# Patient Record
Sex: FEMALE
[De-identification: names, ages, dates, MRNs, and addresses within clinical notes are randomized per-mention and may not be internally consistent; named-entity substitution may affect disease eponyms.]

---

## 2017-08-08 NOTE — CT
PROCEDURE:  CT HEAD WITHOUT CONTRAST.



HISTORY:

head injury/pain



COMPARISON:

06/21/2016 



TECHNIQUE:

Axial computed tomography images were obtained through the head/brain 

without intravenous contrast.  



Radiation dose:



Total exam DLP = 1112 mGy-cm.



This CT exam was performed using one or more of the following dose 

reduction techniques: Automated exposure control, adjustment of the 

mA and/or kV according to patient size, and/or use of iterative 

reconstruction technique.



FINDINGS:



HEMORRHAGE:

No intracranial hemorrhage. 



BRAIN:

No mass effect or edema.  Moderate atrophy.  Mild chronic 

microvascular changes



VENTRICLES:

Unremarkable. No hydrocephalus. 



CALVARIUM:

Unremarkable.



PARANASAL SINUSES:

Unremarkable as visualized. No significant inflammatory changes.



MASTOID AIR CELLS:

Unremarkable as visualized. No inflammatory changes.



OTHER FINDINGS:

None.



IMPRESSION:

No acute findings

## 2017-08-08 NOTE — ED PDOC
Arrival/HPI





- General


Historian: Patient, Spouse





- History of Present Illness


Time/Duration: Other (6 days)


Quality: Aching


Context: Home





- General


Chief Complaint: Trauma


Time Seen by Provider: 08/08/17 09:46





- History of Present Illness


Narrative History of Present Illness (Text): 





08/08/17 09:47


This 80 yo female with pmh osteopenia, htn,mild dementia (as per ) 

presents, to this ED c/o back pain x 6 days.   stated patient had hit 

her left side face during the fall.  Patient stated she tripped and fell 

backwards.  Denies HA, diplopia, dysarthria, dysphagia, sob, cp, /GI 

incontinence, saddle anesthesia, weakness, paresthesias, hip pain, knee pain, 

neck pain, ankle pain, urinary retention, or abnormal gait. (Yovana Manriquez)





Past Medical History





- Provider Review


Nursing Documentation Reviewed: Yes





- Past History


Past History: Non-Contributing





- Infectious Disease


Hx of Infectious Diseases: None





- Tetanus Immunization


Tetanus Immunization: Unknown





- Cardiac


Hx Cardiac Disorders: Yes


Hx Hypertension: Yes





- Pulmonary


Hx Respiratory Disorders: No





- Neurological


Hx Neurological Disorder: No





- HEENT


Hx HEENT Disorder: No





- Renal


Hx Renal Disorder: No





- Endocrine/Metabolic


Hx Endocrine Disorders: Yes


Hx Hyperthyroidism: Yes





- Hematological/Oncological


Hx Blood Disorders: No





- Integumentary


Hx Dermatological Disorder: No





- Musculoskeletal/Rheumatological


Hx Musculoskeletal Disorders: Yes


Hx Falls: Yes (last month and last week)





- Gastrointestinal


Hx Gastrointestinal Disorders: No





- Genitourinary/Gynecological


Hx Genitourinary Disorders: No





- Psychiatric


Hx Psychophysiologic Disorder: No


Hx Depression: No


Hx Emotional Abuse: No


Hx Physical Abuse: No


Hx Substance Use: No





- Surgical History


Hx Appendectomy: Yes





- Anesthesia


Hx Anesthesia: Yes


Hx Anesthesia Reactions: No


Hx Malignant Hyperthermia: No





- Suicidal Assessment


Feels Threatened In Home Enviroment: No





Family/Social History





- Physician Review


Nursing Documentation Reviewed: Yes


Family/Social History: No Known Family HX


Smoking Status: Never Smoked


Hx Alcohol Use: No


Hx Substance Use: No


Hx Substance Use Treatment: No





Allergies/Home Meds


Allergies/Adverse Reactions: 


Allergies





No Known Allergies Allergy (Verified 08/08/17 09:31)


 








Home Medications: 


 Home Meds











 Medication  Instructions  Recorded  Confirmed


 


Amlodipine Besylate [Norvasc] 10 mg PO DAILY 02/14/13 08/08/17


 


Aspirin [Aspirin Children's] 81 mg PO DAILY 02/14/13 08/08/17


 


Calcium Carbonate/Vitamin D 1 tab PO DAILY 02/14/13 08/08/17





[Calcium + D 600 mg-200 Iu]   


 


Levothyroxine Sodium [Synthroid] 0.1 mg PO DAILY 02/14/13 08/08/17


 


Multivitamin and Minerals1 1 tab PO DAILY 02/14/13 08/08/17





[Centrum]   


 


Simvastatin 20 mg PO DAILY 02/14/13 08/08/17


 


Lisinopril [Zestril] 2.5 mg PO DAILY 08/08/17 08/08/17














Review of Systems





- Review of Systems


Constitutional: Normal.  absent: Fatigue, Weight Change, Fevers, Night Sweats


Eyes: Normal


ENT: Normal


Respiratory: Normal.  absent: SOB, Cough


Cardiovascular: Normal.  absent: Chest Pain, Palpitations


Gastrointestinal: Normal.  absent: Abdominal Pain, Nausea, Vomiting


Genitourinary Female: Normal.  absent: Dysuria, Frequency, Hematuria, Vaginal 

Bleeding, Vaginal Discharge


Musculoskeletal: Back Pain.  absent: Arthralgias, Neck Pain, Joint Swelling, 

Myalgias


Skin: Normal.  absent: Rash, Laceration


Neurological: Normal.  absent: Headache, Dizziness, Focal Weakness, Gait Changes

, Speech Changes, Facial Droop, Disequilibrium, Seizure


Endocrine: Normal


Hemo/Lymphatic: Normal


Psychiatric: Normal





Physical Exam


Temperature: Afebrile


Blood Pressure: Normal


Pulse: Regular


Respiratory Rate: Normal


Appearance: Positive for: Well-Appearing, Non-Toxic, Comfortable


Pain Distress: None


Mental Status: Positive for: Alert and Oriented X 3





- Systems Exam


Head: Present: Atraumatic, Normocephalic, Other ((+) small left cheek bruise.  

No raccoon sign.  no harrison sign)


Pupils: Present: PERRL, Other (no hyphema)


Extroacular Muscles: Present: EOMI.  No: Entrapment


Conjunctiva: Present: Normal


Ears: Present: Normal, NORMAL TM, Normal Canal, Other (No hemotympanum).  No: 

Erythema, TM Bulging, TM Perf


Mouth: Present: Moist Mucous Membranes


Pharnyx: Present: Normal.  No: ERYTHEMA, EXUDATE


Nose (External): Present: Atraumatic


Nose (Internal): Present: Normal Inspection


Neck: Present: Normal Range of Motion.  No: Meningeal Signs


Respiratory/Chest: Present: Clear to Auscultation, Good Air Exchange.  No: 

Respiratory Distress, Accessory Muscle Use


Cardiovascular: Present: Regular Rate and Rhythm, Normal S1, S2.  No: Murmurs


Abdomen: Present: Normal Bowel Sounds.  No: Tenderness, Distention, Peritoneal 

Signs


Back: Present: Normal Inspection.  No: CVA Tenderness, Midline Tenderness, 

Paraspinal Tenderness, Pain with Leg Raise


Upper Extremity: Present: Normal Inspection, Normal ROM, NORMAL PULSES, 

Neurovascularly Intact, Capillary Refill < 2s.  No: Cyanosis, Edema


Lower Extremity: Present: Normal Inspection, NORMAL PULSES, Normal ROM.  No: 

Edema, CALF TENDERNESS


Neurological: Present: GCS=15, CN II-XII Intact, Speech Normal, Motor Func 

Grossly Intact, Normal Sensory Function, Normal Cerebellar Funct, Gait Normal, 

Memory Normal


Skin: Present: Warm, Dry, Normal Color.  No: Rashes


Psychiatric: Present: Alert, Oriented x 3, Normal Insight, Normal Concentration


Vital Signs











  Temp Pulse Resp BP Pulse Ox


 


 08/08/17 09:39  97.7 F  69  18  117/69  95














Medical Decision Making


Re-evaluation Time: 11:56


Reassessment Condition: Re-examined, Improved





- Lab Interpretations


I have reviewed the lab results: Yes


Interpretation: No clinic. lab abnormalty


ED Course and Treatment: 


I was available for consultation during PA evaluation. The chart was reviewed 

by me, and I agree with disposition. The documented history was done by the 

physician extender. The documented physical exam was done by the physician 

extender. The documented procedures were done by the physician extender.


 (Faustino Knapp)








08/08/17 11:49


Patient came c/o back pain x 6 days after a mechanical fall.  Patient denies 

other complains, except for left facial bruise.   is at bedside.  CT 

head was negative.  CT L-Spine demonstrates a lumbar spine Fx.  Patient prefers 

out-patient treatment and follow up.  I spoke with patient and  

regarding Tylenol with codeine could cause drowsiness, and put patient at 

higher risk of following.   Patient and  stated they will take pain 

medication when really necessary. (Yovana Manriquez)





- Lab Interpretations


Lab Results: 


 Lab Results





08/08/17 11:05: Urine Color Yellow, Urine Appearance Clear, Urine pH 7.5, Ur 

Specific Gravity 1.010, Urine Protein Negative, Urine Glucose (UA) Negative, 

Urine Ketones Negative, Urine Blood Small H, Urine Nitrate Negative, Urine 

Bilirubin Negative, Urine Urobilinogen 0.2, Ur Leukocyte Esterase Negative, 

Urine RBC Pending, Urine WBC Pending











- RAD Interpretation


Narrative RAD Interpretations (Text): 





08/08/17 11:12


Accession No. : [id number]


Patient Name / ID : CHAI HURTADO  / [id number]


Exam Date : 08/08/2017 10:09:30 ( Approved )


Study Comment : 


Sex / Age : F  / 81Y





Creator : Cristopher Morris MD


Dictator : Cristopher Morris MD


 : 


Approver : Cristopher Morris MD


Approver2 : 





Report Date : 08/08/2017 11:04:36


My Comment : 


********************************************************************************

***





PROCEDURE:  CT Lumbar Spine without contrast





HISTORY:


pain  s/p fall





COMPARISON:


None.





TECHNIQUE:


Axial computed tomography images were obtained of the lumbar spine without the 

use of intravenous contrast. Coronal and sagittal reformatted images were 

created and reviewed. 





Radiation dose:





Total exam DLP = 426.65 mGy-cm.





This CT exam was performed using one or more of the following dose reduction 

techniques: Automated exposure control, adjustment of the mA and/or kV 

according to patient size, and/or use of iterative reconstruction technique.





FINDINGS:





VERTEBRAE:


There is minimal compression deformity of the superior L1 vertebral endplate.  

There is mild retropulsion of the posterior rim of the superior L1 vertebral 

endplate, asymmetrically towards the right side. There is no evidence of 

epidural hemorrhage. The remaining vertebral bodies are maintained in height. 

There is marked dextroscoliotic curvature of the lumbar spine. There is no 

listhesis evident. 





DISCS/SPINAL CANAL/NEURAL FORAMINA:


L1-2:     Unremarkable.





L2-3:    Asymmetric narrowing of left side of the disc space in conjunction 

with scoliotic deformity. No disc bulge or herniation. Unilateral left bony 

neural foraminal stenosis. 





L3-4:    Asymmetric narrowing of the left side of the intervertebral disc space 

in conjunction with scoliotic deformity. Mild diffuse disc bulge. No focal 

herniation.  No central spinal stenosis. Unilateral left bony neural foraminal 

stenosis. 





L4-5:    Asymmetric narrowing of the right side of the intervertebral disc 

space with extensive subchondral sclerosis asymmetrically towards the right 

side.  Diffuse disc bulge. No focal herniation.  No central spinal stenosis. 

Right bony neural foraminal stenosis.





L5-S1:  Mild diffuse disc bulge. Disc space asymmetrically narrowed towards the 

right side. Mild right neural foraminal stenosis. No central spinal stenosis.





PARASPINAL SOFT TISSUES:


Unremarkable. 





OTHER FINDINGS:


None. 





IMPRESSION:


Mild superior L1 vertebral endplate compression deformity with bony 

retropulsion not resulting in fanta central spinal stenosis. Indeterminate age 

but possibly acute. No other fracture.  Severe scoliotic deformity.  Multilevel 

disc bulge and neural foraminal stenosis without central spinal stenosis.














08/08/17 11:12


Accession No. : [id number]


Patient Name / ID : CHAI HURTADO  / [id number]


Exam Date : 08/08/2017 10:05:05 ( Approved )


Study Comment : 


Sex / Age : F  / 81Y





Creator : Stevenson Eli MD


Dictator : Stevenson Eli MD


 : 


Approver : Stevenson Eli MD


Approver2 : 





Report Date : 08/08/2017 10:25:48


My Comment : 


********************************************************************************

***





PROCEDURE:  CT HEAD WITHOUT CONTRAST.





HISTORY:


head injury/pain





COMPARISON:


06/21/2016 





TECHNIQUE:


Axial computed tomography images were obtained through the head/brain without 

intravenous contrast.  





Radiation dose:





Total exam DLP = 1112 mGy-cm.





This CT exam was performed using one or more of the following dose reduction 

techniques: Automated exposure control, adjustment of the mA and/or kV 

according to patient size, and/or use of iterative reconstruction technique.





FINDINGS:





HEMORRHAGE:


No intracranial hemorrhage. 





BRAIN:


No mass effect or edema.  Moderate atrophy.  Mild chronic microvascular changes





VENTRICLES:


Unremarkable. No hydrocephalus. 





CALVARIUM:


Unremarkable.





PARANASAL SINUSES:


Unremarkable as visualized. No significant inflammatory changes.





MASTOID AIR CELLS:


Unremarkable as visualized. No inflammatory changes.





OTHER FINDINGS:


None.





IMPRESSION:


No acute findings


 (Yovana Manriquez)


Radiology Orders: 








08/08/17 09:46


HEAD W/O CONTRAST [CT] Stat 





08/08/17 09:47


LUMBAR SPINE W/O CONTRAST [CT] Stat 














Disposition/Present on Arrival





- Present on Arrival


Any Indicators Present on Arrival: No


History of DVT/PE: No


History of Uncontrolled Diabetes: No


Urinary Catheter: No


History of Decub. Ulcer: No


History Surgical Site Infection Following: None





- Disposition


Have Diagnosis and Disposition been Completed?: Yes


Disposition Time: 12:00


Patient Plan: Discharge





- Disposition


Diagnosis: 


 Lumbar compression fracture





Disposition: HOME/ ROUTINE


Condition: GOOD


Discharge Instructions (ExitCare):  Vertebral Compression Fracture (ED)


Additional Instructions: 


Call private doctor for follow up visit in 1-2 days.  Also call Orthopedist for 

revaluation.  Take medication as instructed with food.  Return to emergency if 

symptoms worsen.


Prescriptions: 


Acetaminophen with Codeine [Tylenol with Codeine #3 Tablet] 1 each PO Q6H PRN #

20 tablet


 PRN Reason: Pain, Severe (8-10)


Referrals: 


Christopher Jamil MD [Staff Provider] - Follow up with primary


Koko Jeffries MD [Staff Provider] - Follow up with primary


Forms:  [employer] (English)

## 2017-08-08 NOTE — CT
PROCEDURE:  CT Lumbar Spine without contrast



HISTORY:

pain  s/p fall



COMPARISON:

None.



TECHNIQUE:

Axial computed tomography images were obtained of the lumbar spine 

without the use of intravenous contrast. Coronal and sagittal 

reformatted images were created and reviewed. 



Radiation dose:



Total exam DLP = 426.65 mGy-cm.



This CT exam was performed using one or more of the following dose 

reduction techniques: Automated exposure control, adjustment of the 

mA and/or kV according to patient size, and/or use of iterative 

reconstruction technique.



FINDINGS:



VERTEBRAE:

There is minimal compression deformity of the superior L1 vertebral 

endplate.  There is mild retropulsion of the posterior rim of the 

superior L1 vertebral endplate, asymmetrically towards the right 

side. There is no evidence of epidural hemorrhage. The remaining 

vertebral bodies are maintained in height. There is marked 

dextroscoliotic curvature of the lumbar spine. There is no listhesis 

evident. 



DISCS/SPINAL CANAL/NEURAL FORAMINA:

L1-2:     Unremarkable.



L2-3:    Asymmetric narrowing of left side of the disc space in 

conjunction with scoliotic deformity. No disc bulge or herniation. 

Unilateral left bony neural foraminal stenosis. 



L3-4:    Asymmetric narrowing of the left side of the intervertebral 

disc space in conjunction with scoliotic deformity. Mild diffuse disc 

bulge. No focal herniation.  No central spinal stenosis. Unilateral 

left bony neural foraminal stenosis. 



L4-5:    Asymmetric narrowing of the right side of the intervertebral 

disc space with extensive subchondral sclerosis asymmetrically 

towards the right side.  Diffuse disc bulge. No focal herniation.  No 

central spinal stenosis. Right bony neural foraminal stenosis.



L5-S1:  Mild diffuse disc bulge. Disc space asymmetrically narrowed 

towards the right side. Mild right neural foraminal stenosis. No 

central spinal stenosis.



PARASPINAL SOFT TISSUES:

Unremarkable. 



OTHER FINDINGS:

None. 



IMPRESSION:

Mild superior L1 vertebral endplate compression deformity with bony 

retropulsion not resulting in fanta central spinal stenosis. 

Indeterminate age but possibly acute. No other fracture.  Severe 

scoliotic deformity.  Multilevel disc bulge and neural foraminal 

stenosis without central spinal stenosis.

## 2018-01-19 ENCOUNTER — HOSPITAL ENCOUNTER (EMERGENCY)
Dept: HOSPITAL 42 - ED | Age: 83
Discharge: HOME | End: 2018-01-19
Payer: COMMERCIAL

## 2018-01-19 VITALS — OXYGEN SATURATION: 96 % | DIASTOLIC BLOOD PRESSURE: 75 MMHG | SYSTOLIC BLOOD PRESSURE: 112 MMHG | HEART RATE: 60 BPM

## 2018-01-19 VITALS — BODY MASS INDEX: 20.3 KG/M2

## 2018-01-19 VITALS — TEMPERATURE: 97.1 F | RESPIRATION RATE: 18 BRPM

## 2018-01-19 DIAGNOSIS — I10: ICD-10-CM

## 2018-01-19 DIAGNOSIS — F03.90: Primary | ICD-10-CM

## 2018-01-19 LAB
ALBUMIN SERPL-MCNC: 3.8 G/DL (ref 3–4.8)
ALBUMIN/GLOB SERPL: 1.3 {RATIO} (ref 1.1–1.8)
ALT SERPL-CCNC: 27 U/L (ref 7–56)
AST SERPL-CCNC: 26 U/L (ref 14–36)
BASOPHILS # BLD AUTO: 0.02 K/MM3 (ref 0–2)
BASOPHILS NFR BLD: 0.2 % (ref 0–3)
BASOPHILS NFR BLD: 1 % (ref 0–1)
BUN SERPL-MCNC: 18 MG/DL (ref 7–21)
CALCIUM SERPL-MCNC: 9.4 MG/DL (ref 8.4–10.5)
EOSINOPHIL # BLD: 0.1 10*3/UL (ref 0–0.7)
EOSINOPHIL NFR BLD: 1.7 % (ref 1.5–5)
ERYTHROCYTE [DISTWIDTH] IN BLOOD BY AUTOMATED COUNT: 13.8 % (ref 11.5–14.5)
GFR NON-AFRICAN AMERICAN: > 60
GRANULOCYTES # BLD: 3.88 10*3/UL (ref 1.4–6.5)
GRANULOCYTES NFR BLD: 48.6 % (ref 50–68)
HGB BLD-MCNC: 12.8 G/DL (ref 12–16)
LYMPHOCYTE: 34 % (ref 22–35)
LYMPHOCYTES # BLD: 2.2 10*3/UL (ref 1.2–3.4)
LYMPHOCYTES NFR BLD AUTO: 27.8 % (ref 22–35)
MCH RBC QN AUTO: 31.2 PG (ref 25–35)
MCHC RBC AUTO-ENTMCNC: 32 G/DL (ref 31–37)
MCV RBC AUTO: 97.6 FL (ref 80–105)
MONOCYTE: 19 % (ref 1–6)
MONOCYTES # BLD AUTO: 1.7 10*3/UL (ref 0.1–0.6)
MONOCYTES NFR BLD: 21.7 % (ref 1–6)
NEUTROPHILS NFR BLD AUTO: 46 % (ref 50–70)
PLATELET # BLD EST: NORMAL 10*3/UL
PLATELET # BLD: 223 10^3/UL (ref 120–450)
PMV BLD AUTO: 11.7 FL (ref 7–11)
RBC # BLD AUTO: 4.1 10^6/UL (ref 3.5–6.1)
WBC # BLD AUTO: 8 10^3/UL (ref 4.5–11)

## 2018-01-19 NOTE — CT
EXAM:

  CT Head Without Intravenous Contrast



CLINICAL HISTORY:

  82 years old, female; Signs and symptoms; Altered mental status/memory loss; 

Additional info: AMS



TECHNIQUE:

  Axial computed tomography images of the head/brain without intravenous 

contrast.  All CT scans at this facility use one or more dose reduction 

techniques, viz.: automated exposure control; ma/kV adjustment per patient size 

(including targeted exams where dose is matched to indication; i.e. head); or 

iterative reconstruction technique.

  Coronal and sagittal reformatted images were created and reviewed.



COMPARISON:

  CT - HEAD W/O CONTRAST 2017-08-08 10:05



FINDINGS:

  Brain:  Moderate atrophy.  No intracranial hemorrhage.  No mass.  Minimal 

decreased attenuation within periventricular white matter.  No definite edema.

  Ventricles:  No hydrocephalus.

  Bones/joints:  No acute fracture.  Degenerative changes of temporomandibular 

joints.

  Soft tissues:  Unremarkable.

  Vasculature:  Minimal atherosclerotic disease of intracranial arteries.

  Sinuses:  Mild mucosal thickening of LEFT maxillary sinus.  Scattered minimal 

mucosal thickening of ethmoid sinuses.  RIGHT maxillary retention cyst.

  Mastoid air cells:  No mastoid effusion.

  Orbits:  Unremarkable as visualized.



IMPRESSION:     

1.  Nonspecific white matter changes.  Acute infarction may be CT occult within 

first 24 hours.  If a focal deficit persists, consider followup CT or MRI for 

further evaluation.

2.  Incidental/non-acute findings are described above.

## 2018-01-19 NOTE — ED PDOC
Arrival/HPI





- General


Chief Complaint: Altered Mental Status


Time Seen by Provider: 01/19/18 01:20


Historian: Patient





- History of Present Illness


Narrative History of Present Illness (Text): 





01/19/18 01:26





82 year old female, with past medical history of osteopenia, hypertension and 

mild dementia, presents to the Emergency department via EMS complaining of 

confusion tonight. Patient states she went out to take a walk in the evening 

but could not recall her home address to return to. Patient denies similar 

symptoms in the past. Patient currently denies any complaints. Patient denies 

any chest pain, shortness of breath, abdominal pain, fevers, chills, nausea, 

vomiting, diarrhea, trauma or any other complaints. 








Time/Duration: Prior to Arrival


Symptom Onset: Gradual


Symptom Course: Unchanged


Context: Walking





Past Medical History





- Provider Review


Nursing Documentation Reviewed: Yes





- Past History


Past History: Non-Contributing





- Infectious Disease


Hx of Infectious Diseases: None





- Tetanus Immunization


Tetanus Immunization: Unknown





- Reproductive


Menopause: Yes





- Cardiac


Hx Cardiac Disorders: Yes


Hx Hypertension: Yes





- Pulmonary


Hx Respiratory Disorders: No





- Neurological


Hx Neurological Disorder: No





- HEENT


Hx HEENT Disorder: No





- Renal


Hx Renal Disorder: No





- Endocrine/Metabolic


Hx Endocrine Disorders: Yes


Hx Hyperthyroidism: Yes





- Hematological/Oncological


Hx Blood Disorders: No





- Integumentary


Hx Dermatological Disorder: No





- Musculoskeletal/Rheumatological


Hx Musculoskeletal Disorders: Yes


Hx Falls: Yes





- Gastrointestinal


Hx Gastrointestinal Disorders: No





- Genitourinary/Gynecological


Hx Genitourinary Disorders: No





- Psychiatric


Hx Psychophysiologic Disorder: No


Hx Depression: No


Hx Emotional Abuse: No


Hx Physical Abuse: No


Hx Substance Use: No





- Surgical History


Hx Appendectomy: Yes





- Anesthesia


Hx Anesthesia: Yes


Hx Anesthesia Reactions: No


Hx Malignant Hyperthermia: No





- Suicidal Assessment


Feels Threatened In Home Enviroment: No





Family/Social History





- Physician Review


Nursing Documentation Reviewed: Yes


Family/Social History: No Known Family HX


Smoking Status: Never Smoked


Hx Alcohol Use: No


Hx Substance Use: No


Hx Substance Use Treatment: No





Allergies/Home Meds


Allergies/Adverse Reactions: 


Allergies





No Known Allergies Allergy (Verified 01/19/18 01:20)


 








Home Medications: 


 Home Meds











 Medication  Instructions  Recorded  Confirmed


 


Amlodipine Besylate [Norvasc] 10 mg PO DAILY 02/14/13 08/08/17


 


Aspirin [Aspirin Children's] 81 mg PO DAILY 02/14/13 08/08/17


 


Calcium Carbonate/Vitamin D 1 tab PO DAILY 02/14/13 08/08/17





[Calcium + D 600 mg-200 Iu]   


 


Levothyroxine Sodium [Synthroid] 0.1 mg PO DAILY 02/14/13 08/08/17


 


Multivitamin and Minerals1 1 tab PO DAILY 02/14/13 08/08/17





[Centrum]   


 


Simvastatin 20 mg PO DAILY 02/14/13 08/08/17


 


Lisinopril [Zestril] 2.5 mg PO DAILY 08/08/17 08/08/17














Review of Systems





- Physician Review


All systems were reviewed & negative as marked: Yes





- Review of Systems


Constitutional: Normal.  absent: Fevers


Eyes: Normal


ENT: Normal


Respiratory: Normal.  absent: SOB


Cardiovascular: Normal.  absent: Chest Pain


Gastrointestinal: Normal.  absent: Abdominal Pain, Diarrhea, Nausea, Vomiting


Genitourinary Female: Normal


Musculoskeletal: Normal


Skin: Normal


Neurological: Normal


Endocrine: Normal


Hemo/Lymphatic: Normal


Psychiatric: Normal





Physical Exam


Vital Signs Reviewed: Yes


Vital Signs











  Temp Pulse Resp BP Pulse Ox


 


 01/19/18 01:21  97.1 F L  61  18  127/69  99











Temperature: Afebrile


Blood Pressure: Normal


Pulse: Regular


Respiratory Rate: Normal


Appearance: Positive for: Well-Appearing, Non-Toxic, Comfortable, Other (

Friendly with repetitive verbalization. )


Pain Distress: None


Mental Status: Positive for: Confused (mildly confused )





- Systems Exam


Head: Present: Atraumatic, Normocephalic


Pupils: Present: PERRL


Extroacular Muscles: Present: EOMI


Conjunctiva: Present: Normal


Mouth: Present: Moist Mucous Membranes


Neck: Present: Normal Range of Motion


Respiratory/Chest: Present: Clear to Auscultation, Good Air Exchange.  No: 

Respiratory Distress, Accessory Muscle Use


Cardiovascular: Present: Regular Rate and Rhythm, Normal S1, S2.  No: Murmurs


Abdomen: Present: Normal Bowel Sounds.  No: Tenderness, Distention, Peritoneal 

Signs


Back: Present: Normal Inspection


Upper Extremity: Present: Normal Inspection.  No: Cyanosis, Edema


Lower Extremity: Present: Normal Inspection.  No: Edema


Neurological: Present: GCS=15, CN II-XII Intact, Speech Normal


Skin: Present: Warm, Dry, Normal Color.  No: Rashes


Psychiatric: Present: Alert, Normal Insight, Normal Concentration





Medical Decision Making


ED Course and Treatment: 





01/19/18 01:32


Impression: 82 year old female presents to the Emergency department for 

confusion. 





Plan:





-- Reassess and disposition








Progress Notes:


 





- Lab Interpretations


Lab Results: 








 01/19/18 01:49 





 01/19/18 01:49 





 Lab Results





01/19/18 01:49: Sodium 140, Potassium 4.1, Chloride 104, Carbon Dioxide 25, 

Anion Gap 15, BUN 18, Creatinine 0.6 L, Est GFR ( Amer) > 60, Est GFR (

Non-Af Amer) > 60, Random Glucose 96, Calcium 9.4, Total Bilirubin 0.5, AST 26, 

ALT 27, Alkaline Phosphatase 64, Total Protein 6.9, Albumin 3.8, Globulin 3.0, 

Albumin/Globulin Ratio 1.3


01/19/18 01:49: WBC 8.0, RBC 4.10, Hgb 12.8, Hct 40.0, MCV 97.6, MCH 31.2, MCHC 

32.0, RDW 13.8, Plt Count 223, MPV 11.7 H, Gran % 48.6 L, Lymph % (Auto) 27.8, 

Mono % (Auto) 21.7 H, Eos % (Auto) 1.7, Baso % (Auto) 0.2, Gran # 3.88, Lymph # 

2.2, Mono # 1.7 H, Eos # 0.1, Baso # 0.02, Neutrophils % (Manual) Pending, 

Lymphocytes % (Manual) Pending, Monocytes % (Manual) Pending











- RAD Interpretation


Radiology Orders: 








01/19/18 01:33


HEAD W/O CONTRAST [CT] Stat 














- Scribe Statement


The provider has reviewed the documentation as recorded by the Scribe


Grayson Cardozo.





All medical record entries made by the Scribe were at my direction and 

personally dictated by me. I have reviewed the chart and agree that the record 

accurately reflects my personal performance of the history, physical exam, 

medical decision making, and the department course for this patient. I have 

also personally directed, reviewed, and agree with the discharge instructions 

and disposition.





Disposition/Present on Arrival





- Present on Arrival


Any Indicators Present on Arrival: No


History of DVT/PE: No


History of Uncontrolled Diabetes: No


Urinary Catheter: No


History of Decub. Ulcer: No


History Surgical Site Infection Following: None





- Disposition


Have Diagnosis and Disposition been Completed?: Yes


Diagnosis: 


 Dementia





Disposition: HOME/ ROUTINE


Disposition Time: 03:00


Condition: STABLE


Additional Instructions: 


see your doctor to consider further treatment for dementia.


Forms:  [employer] (English)

## 2018-12-03 ENCOUNTER — HOSPITAL ENCOUNTER (INPATIENT)
Dept: HOSPITAL 42 - ED | Age: 83
LOS: 3 days | Discharge: NURSING, REHAB OR LONG-TERM CARE | DRG: 603 | End: 2018-12-06
Attending: INTERNAL MEDICINE | Admitting: INTERNAL MEDICINE
Payer: MEDICARE

## 2018-12-03 VITALS — BODY MASS INDEX: 33.5 KG/M2

## 2018-12-03 DIAGNOSIS — L03.115: Primary | ICD-10-CM

## 2018-12-03 DIAGNOSIS — M79.89: ICD-10-CM

## 2018-12-03 DIAGNOSIS — D50.9: ICD-10-CM

## 2018-12-03 DIAGNOSIS — M79.671: ICD-10-CM

## 2018-12-03 DIAGNOSIS — R26.9: ICD-10-CM

## 2018-12-03 DIAGNOSIS — M20.11: ICD-10-CM

## 2018-12-03 DIAGNOSIS — E87.6: ICD-10-CM

## 2018-12-03 DIAGNOSIS — H91.90: ICD-10-CM

## 2018-12-03 DIAGNOSIS — F02.80: ICD-10-CM

## 2018-12-03 DIAGNOSIS — G30.9: ICD-10-CM

## 2018-12-03 DIAGNOSIS — E03.9: ICD-10-CM

## 2018-12-03 DIAGNOSIS — I10: ICD-10-CM

## 2018-12-03 DIAGNOSIS — Z91.19: ICD-10-CM

## 2018-12-03 DIAGNOSIS — E78.5: ICD-10-CM

## 2018-12-03 DIAGNOSIS — M81.0: ICD-10-CM

## 2018-12-03 LAB
ALBUMIN SERPL-MCNC: 3.5 G/DL (ref 3–4.8)
ALBUMIN/GLOB SERPL: 1.1 {RATIO} (ref 1.1–1.8)
ALT SERPL-CCNC: 26 U/L (ref 7–56)
AST SERPL-CCNC: 19 U/L (ref 14–36)
BASOPHILS # BLD AUTO: 0.01 K/MM3 (ref 0–2)
BASOPHILS NFR BLD: 0.1 % (ref 0–3)
BUN SERPL-MCNC: 17 MG/DL (ref 7–21)
CALCIUM SERPL-MCNC: 9.1 MG/DL (ref 8.4–10.5)
EOSINOPHIL # BLD: 0 10*3/UL (ref 0–0.7)
EOSINOPHIL NFR BLD: 0.3 % (ref 1.5–5)
ERYTHROCYTE [DISTWIDTH] IN BLOOD BY AUTOMATED COUNT: 15 % (ref 11.5–14.5)
GFR NON-AFRICAN AMERICAN: > 60
GRANULOCYTES # BLD: 6.44 10*3/UL (ref 1.4–6.5)
GRANULOCYTES NFR BLD: 66.7 % (ref 50–68)
HGB BLD-MCNC: 11.2 G/DL (ref 12–16)
LYMPHOCYTES # BLD: 1.3 10*3/UL (ref 1.2–3.4)
LYMPHOCYTES NFR BLD AUTO: 13.8 % (ref 22–35)
MCH RBC QN AUTO: 29.6 PG (ref 25–35)
MCHC RBC AUTO-ENTMCNC: 31.4 G/DL (ref 31–37)
MCV RBC AUTO: 94.2 FL (ref 80–105)
MONOCYTES # BLD AUTO: 1.8 10*3/UL (ref 0.1–0.6)
MONOCYTES NFR BLD: 19.1 % (ref 1–6)
PLATELET # BLD: 140 10^3/UL (ref 120–450)
PMV BLD AUTO: 10.8 FL (ref 7–11)
RBC # BLD AUTO: 3.79 10^6/UL (ref 3.5–6.1)
WBC # BLD AUTO: 9.7 10^3/UL (ref 4.5–11)

## 2018-12-03 NOTE — RAD
Date of service: 



12/03/2018



HISTORY:

 cough and foot infection 



COMPARISON:

01/07/2016 



FINDINGS:



LUNGS:

No active pulmonary disease.



PLEURA:

No significant pleural effusion identified, no pneumothorax apparent.



CARDIOVASCULAR:

Aortic calcification



Normal cardiac size. No pulmonary vascular congestion. 



OSSEOUS STRUCTURES:

No significant abnormalities.



VISUALIZED UPPER ABDOMEN:

Normal.



OTHER FINDINGS:

None.



IMPRESSION:

No active disease.

## 2018-12-03 NOTE — US
PROCEDURE:  Right lower extremity venous US 



HISTORY:

Leg pain and swelling. Evaluate for DVT.



PHYSICIAN(S):  Cristopher Lamar M.D.



TECHNIQUE:

Duplex sonography and color-flow Doppler with graded compression were 

used to evaluate the deep venous system of the right lower extremity. 



FINDINGS:

The visualized deep venous system of the right lower extremity is 

sonographically normal and compressible. Normal waveforms and 

augmentation are seen. There is no sonographic evidence for deep 

venous thrombosis in the visualized segments of the right lower 

extremity.



IMPRESSION:

1. No sonographic evidence for deep venous thrombosis in the 

visualized segments of the right lower extremity.

## 2018-12-03 NOTE — RAD
Date of service: 



12/03/2018



PROCEDURE:  Right Foot Radiographs.



HISTORY:

 foot pain 



COMPARISON:

None.



FINDINGS:



BONES:

Mild to moderate hallux valgus deformity 



JOINTS:

Normal. 



SOFT TISSUES:

Normal. 



OTHER FINDINGS:

None.



IMPRESSION:

Mild to moderate hallux valgus deformity

## 2018-12-03 NOTE — RAD
Date of service: 



12/03/2018



PROCEDURE:  Right Ankle Radiographs.



HISTORY:

 fall/ infection 



COMPARISON:

None available.



FINDINGS:



BONES:

Normal. No fracture. 



JOINTS:

Normal. No osteoarthritis. Ankle mortise maintained. Talar dome intact



SOFT TISSUES:

Normal. 



OTHER FINDINGS:

None.



IMPRESSION:

Negative study

## 2018-12-03 NOTE — CP.PCM.CON
<Long Dougherty - Last Filed: 12/03/18 19:01>





History of Present Illness





- History of Present Illness


History of Present Illness: 





Podiatry consult note for attending Dr. Leyva:





82 year old female patient with PMH of osteopenia, hypertension and mild 

dementia, seen and evaluated in the Emergency department pain, redness and 

swelling in her right foot and ankle. Patient states that 3 days ago she tripped

then her right foot and ankle started to get painful, swollen and red. Patient 

states that her right lower extremity got worse in the previous 3 days. Patient 

accompanied at the bedside by her daughter who states that the patient is poor 

historian and some times she provides inaccurate informations. She states that 

since yesterday the patient has a funny walking. Patient denies numbness, 

weakness, tingling in the right lower extremity. Patient denies any chest pain, 

shortness of breath, abdominal pain, fevers, chills, nausea, vomiting, diarrhea,

trauma recently.  She denies any other pedal complaint recently. 








PMH: Osteopenia, hypertension and mild dementia


PSH: Appendectomy


Allergies: NKDA.


Social Hx: Denies smoking, EtOH use or Illicit drug use.























Review of Systems





- Review of Systems


Review of Systems: 





As per HPI





Past Patient History





- Infectious Disease


Hx of Infectious Diseases: None





- Tetanus Immunizations


Tetanus Immunization: Unknown





- Past Social History


Smoking Status: Never Smoked





- CARDIAC


Hx Cardiac Disorders: Yes


Hx Hypertension: Yes





- PULMONARY


Hx Respiratory Disorders: No





- NEUROLOGICAL


Hx Neurological Disorder: No





- HEENT


Hx HEENT Problems: No





- RENAL


Hx Chronic Kidney Disease: No





- ENDOCRINE/METABOLIC


Hx Endocrine Disorders: Yes


Hx Hyperthyroidism: Yes





- HEMATOLOGICAL/ONCOLOGICAL


Hx Blood Disorders: No





- INTEGUMENTARY


Hx Dermatological Problems: No





- MUSCULOSKELETAL/RHEUMATOLOGICAL


Hx Musculoskeletal Disorders: Yes


Hx Falls: Yes





- GASTROINTESTINAL


Hx Gastrointestinal Disorders: No





- GENITOURINARY/GYNECOLOGICAL


Hx Genitourinary Disorders: No





- PSYCHIATRIC


Hx Psychophysiologic Disorder: No


Hx Depression: No


Hx Emotional Abuse: No


Hx Physical Abuse: No


Hx Substance Use: No





- SURGICAL HISTORY


Hx Appendectomy: Yes





- ANESTHESIA


Hx Anesthesia: Yes


Hx Anesthesia Reactions: No


Hx Malignant Hyperthermia: No





Meds


Allergies/Adverse Reactions: 


                                    Allergies











Allergy/AdvReac Type Severity Reaction Status Date / Time


 


No Known Allergies Allergy   Verified 01/19/18 01:20














Physical Exam





- Constitutional


Appears: Non-toxic





- Head Exam


Head Exam: ATRAUMATIC, NORMOCEPHALIC





- Extremities Exam


Additional comments: 





B/L LE focused exam:





Vasc: DP 2/4, PT 1/4 due to perimalleolar edema. Cap refill < 3 sec to all 

digits. Temp gradient warm to cool from proximal to distal on the left side and 

warm to warm from proximal to distal. Right moderate non pitting perimalleolar 

edema. Erythema noted i the R LE extending up above the ankle joint. Superficial

varicosities noted on the left LE.





Neuro: Gross and protective sensations are intact b/l.





Derm: Bruising noted to the R 1st and 2nd toes.  Erythema noted i the R LE 

extending up above the ankle joint. No open lesion. no drainage b/l.





MSK: Pain on palpating theRight perimalleolar area. Pain with ROM of the R foot 

and ankle. Pain on palpating the R foot.   Muscle power intact to all groups 5/5

b/l.

















- Neurological Exam


Neurological exam: Alert





Results





- Vital Signs


Recent Vital Signs: 


                                Last Vital Signs











Temp  98.3 F   12/03/18 11:15


 


Pulse  67   12/03/18 17:00


 


Resp  18   12/03/18 17:00


 


BP  114/61   12/03/18 17:00


 


Pulse Ox  96   12/03/18 17:00














- Labs


Result Diagrams: 


                                 12/03/18 12:40





                                 12/03/18 12:40


Labs: 


                         Laboratory Results - last 24 hr











  12/03/18 12/03/18





  12:40 12:40


 


WBC   9.7


 


RBC   3.79


 


Hgb   11.2 L D


 


Hct   35.7 L


 


MCV   94.2


 


MCH   29.6


 


MCHC   31.4


 


RDW   15.0 H


 


Plt Count   140


 


MPV   10.8


 


Gran %   66.7


 


Lymph % (Auto)   13.8 L


 


Mono % (Auto)   19.1 H


 


Eos % (Auto)   0.3 L


 


Baso % (Auto)   0.1


 


Gran #   6.44


 


Lymph # (Auto)   1.3


 


Mono # (Auto)   1.8 H


 


Eos # (Auto)   0.0


 


Baso # (Auto)   0.01


 


Sodium  139 


 


Potassium  3.8 


 


Chloride  105 


 


Carbon Dioxide  28 


 


Anion Gap  9 L 


 


BUN  17 


 


Creatinine  0.6 L 


 


Est GFR ( Amer)  > 60 


 


Est GFR (Non-Af Amer)  > 60 


 


Random Glucose  109 


 


Calcium  9.1 


 


Total Bilirubin  0.8 


 


AST  19 


 


ALT  26 


 


Alkaline Phosphatase  73 


 


Total Protein  6.7 


 


Albumin  3.5 


 


Globulin  3.2 


 


Albumin/Globulin Ratio  1.1 














Assessment & Plan





- Assessment and Plan (Free Text)


Assessment: 








82 y/o F patient seen and evaluated in the bedside for Right foot and ankle 

pain, redness and swelling in her right foot and ankle. 








Plan: 








- Patient seen and evaluated at the bedside in the ED with Dr. Leyva.


- Plan discussed in details with attending dr. Leyva.


- Charts, labs and vitals reviewed: Afebrile, no leukocytosis


- R 3 views foot x-ray; official report Hallux valgus.


- R3 views ankle x-ray; Official report no osseous anomalies.


- R venous duplex; No evidence of DVT.


- Ordered R LE CT scan.


- Applied posterior splint to the RLE.


- RICE protocol educated.


- Patient will be admitted by the primary service.


- Thank you for consulting podiatry service.


- Podiatry will continue to follow up the patient while in house.





- Date & Time


Date: 12/03/18


Time: 19:18





<Aylin Leyva - Last Filed: 12/07/18 15:13>





Results





- Vital Signs


Recent Vital Signs: 


                                Last Vital Signs











Temp  98.6 F   12/06/18 14:00


 


Pulse  65   12/06/18 14:00


 


Resp  18   12/06/18 14:00


 


BP  109/62   12/06/18 14:00


 


Pulse Ox  100   12/06/18 14:00














- Labs


Result Diagrams: 


                                 12/06/18 07:20





                                 12/06/18 07:20





Attending/Attestation





- Attestation


I have personally seen and examined this patient.: Yes


I have fully participated in the care of the patient.: Yes


I have reviewed all pertinent clinical information: Yes


Notes (Text): 





12/07/18 15:12


pt seen and evaluated in the ER with the resident; pt with severe edema and 

cellulitis of the right foot and ankle with pain; small ulcer to the hallux 

which is limited to skin breakdown; xrays reviewed CAT ordered; splint applied

## 2018-12-03 NOTE — CT
Date of service: 



12/03/2018



PROCEDURE:  CT HEAD WITHOUT CONTRAST.



HISTORY:

fall



COMPARISON:

None available.



TECHNIQUE:

Axial computed tomography images were obtained through the head/brain 

without intravenous contrast.  



Radiation dose:



Total exam DLP = 1053.09 mGy-cm.



This CT exam was performed using one or more of the following dose 

reduction techniques: Automated exposure control, adjustment of the 

mA and/or kV according to patient size, and/or use of iterative 

reconstruction technique.



FINDINGS:



HEMORRHAGE:

No intracranial hemorrhage. 



BRAIN:

No mass effect or edema.  Atrophic changes.



VENTRICLES:

Unremarkable. No hydrocephalus. 



CALVARIUM:

Unremarkable.



PARANASAL SINUSES:

Unremarkable as visualized. No significant inflammatory changes.



MASTOID AIR CELLS:

Unremarkable as visualized. No inflammatory changes.



OTHER FINDINGS:

None.



IMPRESSION:

No acute hemorrhage.

## 2018-12-03 NOTE — ED PDOC
Arrival/HPI





- General


Chief Complaint: Trauma


Time Seen by Provider: 12/03/18 11:46


Historian: Patient





- History of Present Illness


Narrative History of Present Illness (Text): 





12/03/18 16:03


83yr old female presents today with 2 day history of right foot pain. pt states 

she tripped and fell 2 days ago and scrapped the right great toe. pt states 

since yesterday she noticed redness to the foot and ankle with increasing 

swelling. pt c/o pain only with palpation. denies numbness, weakness, tingling 

in the extremity. unsure of last tetanus shot. no medications taken at home. no 

other complaints. 





Past Medical History





- Provider Review


Nursing Documentation Reviewed: Yes





- Travel History


Have you recently traveled outside [place] w/in the past 3 mons?: No





- Past History


Past History: Non-Contributing





- Infectious Disease


Hx of Infectious Diseases: None





- Tetanus Immunization


Tetanus Immunization: Unknown





- Cardiac


Hx Cardiac Disorders: Yes


Hx Hypertension: Yes





- Pulmonary


Hx Respiratory Disorders: No





- Neurological


Hx Neurological Disorder: No





- HEENT


Hx HEENT Disorder: No





- Renal


Hx Renal Disorder: No





- Endocrine/Metabolic


Hx Endocrine Disorders: Yes


Hx Hyperthyroidism: Yes





- Hematological/Oncological


Hx Blood Disorders: No





- Integumentary


Hx Dermatological Disorder: No





- Musculoskeletal/Rheumatological


Hx Musculoskeletal Disorders: Yes


Hx Falls: Yes





- Gastrointestinal


Hx Gastrointestinal Disorders: No





- Genitourinary/Gynecological


Hx Genitourinary Disorders: No





- Psychiatric


Hx Psychophysiologic Disorder: No


Hx Depression: No


Hx Emotional Abuse: No


Hx Physical Abuse: No


Hx Substance Use: No





- Surgical History


Hx Appendectomy: Yes





- Anesthesia


Hx Anesthesia: Yes


Hx Anesthesia Reactions: No


Hx Malignant Hyperthermia: No





- Suicidal Assessment


Feels Threatened In Home Enviroment: No





Family/Social History





- Physician Review


Nursing Documentation Reviewed: Yes


Family/Social History: Unknown Family HX


Smoking Status: Never Smoked


Hx Alcohol Use: No


Hx Substance Use: No


Hx Substance Use Treatment: No





Allergies/Home Meds


Allergies/Adverse Reactions: 


Allergies





No Known Allergies Allergy (Verified 01/19/18 01:20)


   








Home Medications: 


                                    Home Meds











 Medication  Instructions  Recorded  Confirmed


 


Calcium Carb/Vit D3/Minerals 1 tab PO DAILY 12/03/18 12/03/18





[Calcium 600+D Plus Minerals Tb]   


 


Levothyroxine [Synthroid] 1 tab PO DAILY 12/03/18 12/03/18


 


Lisinopril [Zestril] 1 tab PO DAILY 12/03/18 12/03/18


 


Simvastatin [Zocor] 1 tab PO DAILY 12/03/18 12/03/18


 


amLODIPine [Norvasc] 1 tab PO DAILY 12/03/18 12/03/18














Review of Systems





- Review of Systems


Constitutional: absent: Fatigue, Fevers


Respiratory: absent: SOB, Cough


Cardiovascular: absent: Chest Pain, Palpitations


Gastrointestinal: absent: Abdominal Pain, Nausea, Vomiting


Musculoskeletal: Arthralgias


Skin: Cellulitis


Neurological: absent: Headache, Dizziness


Psychiatric: absent: Anxiety, Depression





Physical Exam


Vital Signs Reviewed: Yes





Vital Signs











  Temp Pulse Resp BP Pulse Ox


 


 12/03/18 11:15  98.3 F  74  18  119/48 L  96











Temperature: Afebrile


Blood Pressure: Normal


Pulse: Regular


Respiratory Rate: Normal


Appearance: Positive for: Well-Appearing, Non-Toxic, Comfortable


Pain Distress: None


Mental Status: Positive for: Alert and Oriented X 3





- Systems Exam


Head: Present: Atraumatic


Mouth: Present: Moist Mucous Membranes


Neck: Present: Normal Range of Motion


Respiratory/Chest: Present: Clear to Auscultation, Good Air Exchange.  No: 

Respiratory Distress, Accessory Muscle Use


Cardiovascular: Present: Regular Rate and Rhythm, Normal S1, S2.  No: Murmurs


Upper Extremity: Present: Normal ROM


Lower Extremity: Present: CALF TENDERNESS, NORMAL PULSES, Normal ROM, 

Tenderness, Swelling, Erythema, Neurovascularly Intact, Capillary Refill < 2 s, 

Other (there is a superficial abrasion noted over the dorsal aspect of the great

 toe; + erythema of dorsal aspect of right foot and distal 1/3 of lower leg. 

sensation and distal pulses intact. cap refill <2. )


Neurological: Present: GCS=15, Speech Normal


Skin: Present: Warm, Dry, Normal Color


Psychiatric: Present: Alert, Oriented x 3





Medical Decision Making


ED Course and Treatment: 





12/03/18 16:21


83yr old female with right leg cellulitis x 2 days. 





cbc; wnl


cmp; wnl





cxr; no infiltrate





tetanus updated. 





xray right foot; no fracture


xray right ankle; no fracture





head ct; FINDINGS:


HEMORRHAGE:


No intracranial hemorrhage. 


BRAIN:


No mass effect or edema.  Atrophic changes.


VENTRICLES:


Unremarkable. No hydrocephalus. 


CALVARIUM:


Unremarkable.


PARANASAL SINUSES:


Unremarkable as visualized. No significant inflammatory changes.


MASTOID AIR CELLS:


Unremarkable as visualized. No inflammatory changes.


OTHER FINDINGS:


None.


IMPRESSION:


No acute hemorrhage.








blood cultures pending








vanco and zosyn started IV








i discussed case with dr. ellison in depth; accepts admission.  requesting co

nsult ID and podiatry. 








pt seen and evaluated by dr. Yang. 








impression; cellulitis, foot/leg


admit to med/surg











- Lab Interpretations


Lab Results: 











                                 12/03/18 12:40 





                                 12/03/18 12:40 





                                   Lab Results





12/03/18 12:40: WBC 9.7, RBC 3.79, Hgb 11.2 L D, Hct 35.7 L, MCV 94.2, MCH 29.6,

 MCHC 31.4, RDW 15.0 H, Plt Count 140, MPV 10.8, Gran % 66.7, Lymph % (Auto) 

13.8 L, Mono % (Auto) 19.1 H, Eos % (Auto) 0.3 L, Baso % (Auto) 0.1, Gran # 

6.44, Lymph # (Auto) 1.3, Mono # (Auto) 1.8 H, Eos # (Auto) 0.0, Baso # (Auto) 

0.01


12/03/18 12:40: Sodium 139, Potassium 3.8, Chloride 105, Carbon Dioxide 28, 

Anion Gap 9 L, BUN 17, Creatinine 0.6 L, Est GFR ( Amer) > 60, Est GFR 

(Non-Af Amer) > 60, Random Glucose 109, Calcium 9.1, Total Bilirubin 0.8, AST 

19, ALT 26, Alkaline Phosphatase 73, Total Protein 6.7, Albumin 3.5, Globulin 

3.2, Albumin/Globulin Ratio 1.1











- RAD Interpretation


Radiology Orders: 











12/03/18 12:30


CHEST PORTABLE [RAD] Stat 





12/03/18 12:31


ANKLE RIGHT 3 VIEWS ROUTINE [RAD] Stat 


FOOT RIGHT 3 VIEWS ROUTINE [RAD] Stat 


DUPLEX LOWER EXTRM VEIN RIGHT [US] Stat 





12/03/18 12:32


HEAD W/O CONTRAST [CT] Stat 














- Medication Orders


Current Medication Orders: 











Vancomycin HCl (Vancomycin 1gm)  1 gm in 250 mls @ 167 mls/hr IVPB STAT STA; 

Protocol


   Stop: 12/03/18 14:02





Discontinued Medications





Piperacillin Sod/Tazobactam Sod (Zosyn 3.375 In Ns 100ml)  100 mls @ 200 mls/hr 

IVPB STAT STA; Protocol


   Stop: 12/03/18 13:02


   Last Admin: 12/03/18 13:24  Dose: 200 mls/hr





eMAR Start Stop


 Document     12/03/18 13:24  GMD  (Rec: 12/03/18 13:24  GMD  [id number])


     Intravenous Solution


      Start Date                                 12/03/18


      Start Time                                 13:24


      End Date                                   12/03/18


      End time                                   13:54


      Total Infusion Time                        30














Disposition/Present on Arrival





- Present on Arrival


Any Indicators Present on Arrival: No


History of DVT/PE: No


History of Uncontrolled Diabetes: No


Urinary Catheter: No


History of Decub. Ulcer: No


History Surgical Site Infection Following: None





- Disposition


Have Diagnosis and Disposition been Completed?: Yes


Diagnosis: 


 Cellulitis of foot





Disposition: HOSPITALIZED


Disposition Time: 13:28


Patient Plan: Admission


Patient Problems: 


                             Current Active Problems











Problem Status Onset


 


Cellulitis of foot Acute 











Condition: FAIR

## 2018-12-04 LAB
ALBUMIN SERPL-MCNC: 3 G/DL (ref 3–4.8)
ALBUMIN/GLOB SERPL: 1 {RATIO} (ref 1.1–1.8)
ALT SERPL-CCNC: 24 U/L (ref 7–56)
APPEARANCE UR: CLEAR
APTT BLD: 29.6 SECONDS (ref 25.1–36.5)
AST SERPL-CCNC: 19 U/L (ref 14–36)
BACTERIA #/AREA URNS HPF: (no result) /[HPF]
BASOPHILS # BLD AUTO: 0.01 K/MM3 (ref 0–2)
BASOPHILS NFR BLD: 0.1 % (ref 0–3)
BILIRUB UR-MCNC: NEGATIVE MG/DL
BUN SERPL-MCNC: 12 MG/DL (ref 7–21)
CALCIUM SERPL-MCNC: 8.5 MG/DL (ref 8.4–10.5)
COLOR UR: YELLOW
EOSINOPHIL # BLD: 0.1 10*3/UL (ref 0–0.7)
EOSINOPHIL NFR BLD: 1.2 % (ref 1.5–5)
EPI CELLS #/AREA URNS HPF: (no result) /HPF (ref 0–5)
ERYTHROCYTE [DISTWIDTH] IN BLOOD BY AUTOMATED COUNT: 15.4 % (ref 11.5–14.5)
ERYTHROCYTE [SEDIMENTATION RATE] IN BLOOD: 63 MM/HR (ref 0–20)
GFR NON-AFRICAN AMERICAN: > 60
GLUCOSE UR STRIP-MCNC: NEGATIVE MG/DL
GRANULOCYTES # BLD: 4.69 10*3/UL (ref 1.4–6.5)
GRANULOCYTES NFR BLD: 64.8 % (ref 50–68)
HGB BLD-MCNC: 10.6 G/DL (ref 12–16)
INR PPP: 1.28
LEUKOCYTE ESTERASE UR-ACNC: (no result) LEU/UL
LYMPHOCYTES # BLD: 1.2 10*3/UL (ref 1.2–3.4)
LYMPHOCYTES NFR BLD AUTO: 17.1 % (ref 22–35)
MCH RBC QN AUTO: 30.1 PG (ref 25–35)
MCHC RBC AUTO-ENTMCNC: 31.3 G/DL (ref 31–37)
MCV RBC AUTO: 96.3 FL (ref 80–105)
MONOCYTES # BLD AUTO: 1.2 10*3/UL (ref 0.1–0.6)
MONOCYTES NFR BLD: 16.8 % (ref 1–6)
PH UR STRIP: 7 [PH] (ref 4.7–8)
PLATELET # BLD: 126 10^3/UL (ref 120–450)
PMV BLD AUTO: 12.3 FL (ref 7–11)
PROT UR STRIP-MCNC: NEGATIVE MG/DL
PROTHROMBIN TIME: 14.8 SECONDS (ref 9.4–12.5)
RBC # BLD AUTO: 3.52 10^6/UL (ref 3.5–6.1)
RBC # UR STRIP: (no result) /UL
SP GR UR STRIP: 1.01 (ref 1–1.03)
UROBILINOGEN UR STRIP-ACNC: 1 E.U./DL
WBC # BLD AUTO: 7.3 10^3/UL (ref 4.5–11)

## 2018-12-04 RX ADMIN — VANCOMYCIN HYDROCHLORIDE SCH MLS/HR: 1 INJECTION, POWDER, LYOPHILIZED, FOR SOLUTION INTRAVENOUS at 00:26

## 2018-12-04 RX ADMIN — VANCOMYCIN HYDROCHLORIDE SCH MLS/HR: 1 INJECTION, POWDER, LYOPHILIZED, FOR SOLUTION INTRAVENOUS at 21:23

## 2018-12-04 RX ADMIN — CEFEPIME SCH MLS/HR: 1 INJECTION, SOLUTION INTRAVENOUS at 21:22

## 2018-12-04 RX ADMIN — CEFEPIME SCH MLS/HR: 1 INJECTION, SOLUTION INTRAVENOUS at 15:48

## 2018-12-04 RX ADMIN — VANCOMYCIN HYDROCHLORIDE SCH MLS/HR: 1 INJECTION, POWDER, LYOPHILIZED, FOR SOLUTION INTRAVENOUS at 11:20

## 2018-12-04 NOTE — CON
DATE OF CONSULTATION:  12/04/2018



The patient is in bed in no acute distress.



CHIEF COMPLAINT:  Right foot and right leg pain times several days.



HISTORY OF PRESENT ILLNESS:  This is an 83-year-old female known to me from

previous admission in 2016.  At that time, the patient had a right leg

cellulitis.  The patient also has hypertension, osteoporosis, dementia,

hypothyroidism, who is now admitted with a diagnosis of right foot

cellulitis.  Infectious Disease consultation requested.



PAST MEDICAL HISTORY:  Significant for hypertension, osteoporosis,

dementia, hypothyroidism.



PAST SURGICAL HISTORY:  Significant for tonsillectomy and appendectomy.



MEDICATIONS AT HOME:  Reviewed.



ALLERGIES:  THE PATIENT HAS NO KNOWN ALLERGIES.



REVIEW OF SYSTEMS:  The patient has no fevers, no chills.  No nausea, no

vomiting.  No chest pain.  No abdominal pain, diarrhea or constipation.  No

dysuria or frequency.  A 14-point review of systems is performed.



PHYSICAL EXAMINATION:

VITAL SIGNS:  The patient's temperature is 98, blood pressure is 103/50,

respiratory rate of 18, heart rate of 74.

HEENT:  Unremarkable.

NECK:  Supple.

LUNGS:  Decreased breath sounds.

HEART:  Normal S1, S2.

ABDOMEN:  Soft.

EXTREMITIES:  Examination of the lower extremities as per podiatrist's

description.



LABORATORY DATA:  Examination reveals a white count of 7.3.  Sed rate is

63.  Coagulation is noted.  The patient has a hemoglobin of 10. 

Chemistries are reviewed.  No urinalysis is available at this time.



IMAGING:  CAT scan of the lower extremities is ordered and pending.



ASSESSMENT AND PLAN:  This is an 83-year-old female with hypertension,

osteoporosis, dementia, hypothyroidism, history of right leg cellulitis in

2016, now presenting with a right foot and right leg cellulitis.  Must rule

out underlying peripheral arterial disease.  Must rule out osteomyelitis

versus new onset of diabetes.  We will treat the patient with vancomycin

and Maxipime.  The patient is scheduled for a CT of the lower extremities

to rule out osteomyelitis and the compartment syndrome, on vancomycin and

cefepime.  We will check on the imaging, cultures and initial workup

results.





__________________________________________

Bal Rivera MD





DD:  12/04/2018 10:55:12

DT:  12/04/2018 10:59:41

[facility] # [id number]

## 2018-12-04 NOTE — RAD
Date of service: 



12/03/2018



PROCEDURE:  CHEST RADIOGRAPH, 1 VIEW



HISTORY:

foot infection



COMPARISON:

12/03/2018



FINDINGS:



LUNGS:

Clear.



PLEURA:

No pneumothorax or pleural fluid seen.



CARDIOVASCULAR:

 Mild aortic calcification and tortuosity moderate cardiomegaly



OSSEOUS STRUCTURES:

No significant abnormalities.



VISUALIZED UPPER ABDOMEN:

Normal.



OTHER FINDINGS:

None. 



IMPRESSION:

No active disease.

## 2018-12-04 NOTE — CP.PCM.HP
<Yelena Tyler - Last Filed: 12/04/18 18:25>





History of Present Illness





- History of Present Illness


History of Present Illness: 





Please note patient is a poor historian; history as per EMR, and speaking to 

daughter Sneha [phone]





CC: some pain in my foot for 3 days





HPI: 84yo female PMHx HTN, osteopenia, hypothyroidism, and dementia presents 

with right foot pain for 3 days. Patient reports she had a mechanical fall and 

tripped on the sidewalk when she hurt her right foot. As per daughter patient 

was walking with her  and was unable to ambulate well and had trouble 

climbing her stairs at home. Daughter reported the patient's R foot was red, 

swollen and tender to touch and the decision was made to call EMS. Overnight 

patient had no acute events as per nursing. This AM she admitted to pain in her 

RLE when palpated but otherwise denied other complaints.





ROS: 


admits: +RLE pain and swelling


denies: fever, headache, chest pain, SOB, cough, abdominal pain, vomiting, 

bowel/bladder complaints





PMHx: HTN, osteopenia, hypothyroidism and mild dementia


PSurgHx: appendectomy, tonsillectomy


Meds: pls see chart


ALL: NKDA


SocHx: Denies smoking, EtOH use or Illicit drug use





PMD: Dr. Jeffries





Present on Admission





- Present on Admission


Any Indicators Present on Admission: No





Review of Systems





- Review of Systems


Systems not reviewed;Unavailable: Dementia





Past Patient History





- Infectious Disease


Hx of Infectious Diseases: None





- Tetanus Immunizations


Tetanus Immunization: Unknown





- Past Social History


Smoking Status: Never Smoked





- CARDIAC


Hx Cardiac Disorders: No


Hx Angina: No


Hx Cardia Arrhythmia: No


Hx Circulatory Problems: No


Hx Congestive Heart Failure: No


Hx Heart Murmur: No


Hx Heart Transplant: No


Hx Hypercholesterolemia: No


Hx Hypertension: No


Hx Internal Defibrillator: No


Hx Mitral Valve Prolapse: No


Hx Pacemaker: No


Hx Peripheral Edema: No


Hx Peripheral Vascular Disease: No





- PULMONARY


Hx Respiratory Disorders: No


Hx Asthma: No


Hx Bronchitis: No


Hx Chronic Obstructive Pulmonary Disease (COPD): No


Hx Emphysema: No


Hx Pneumonia: No


Hx Respiratory Aspiration: No


Hx Respiratory Tract Infection: No


Hx Sleep Apnea: No


Hx Tuberculosis: No





- NEUROLOGICAL


Hx Neurological Disorder: No


Hx Alzheimer's Disease: No


HX Cerebrovascular Accident: No


Hx Dementia: No


Hx Meningitis: No


Hx Migraine: No


Hx Parkinson's Disease: No


Hx Seizures: No


Hx Transient Ischemic Attacks (TIA): No





- HEENT


Hx HEENT Problems: No


Hx Blind: No


Hx Cataracts: No


Hx Deafness: No


Hx Difficulty Chewing: No


Hx Epistaxis: No


Hx Glaucoma: No


Hx Macular Degeneration: No





- RENAL


Hx Chronic Kidney Disease: No


Hx Dialysis: No


Hx Kidney Stones: No


Hx Neurogenic Bladder: No


Hx Pyelonephritis: No


Hx Renal (Kidney) Cancer: No


Hx Renal Failure: No





- ENDOCRINE/METABOLIC


Hx Endocrine Disorders: No


Hx Adrenal Cancer: No


Hx Diabetes Insipidus: No


Hx Diabetes Mellitus Type 1: No


Hx Diabetes Mellitus Type 2: No


Hx Hypothyroidism: Yes


Hx Systemic Lupus Erythematosus: No





- HEMATOLOGICAL/ONCOLOGICAL


Hx Blood Disorders: No


Hx AIDS: No


Hx Anemia: No


Hx Cancer: No


Hx Chemotherapy: No


Hx Cirrhosis: No


Hx Hemophilia: No


Hx Hepatitis A: No


Hx Hepatitis B: No


Hx Hepatitis C: No


Hx Human Immunodeficiency Virus (HIV): No


Hx Metastesis: No


Hx Shingles: No


Hx Sickle Cell Disease: No


Hx Unexplained Bleeding: No





- INTEGUMENTARY


Hx Dermatological Problems: No


Hx Basil Cell: No


Hx Eczema: No


Hx Melanoma: No


Hx Psoriasis: No


Hx Squamous Cell: No





- MUSCULOSKELETAL/RHEUMATOLOGICAL


Hx Musculoskeletal Disorders: No


Hx Arthritis: No


Hx Back Pain: No


Hx Degenerative Joint Disease: No


Hx Falls: No


Hx Fractures: No


Hx Gout: No


Hx Herniated Disk: No


Hx Myasthenia Gravis: No


Hx Osteoarthritis: No


Hx Osteomyelitis: No


Hx Osteoporosis: No


Hx Rhabdomyolysis: No


Hx Spinal Stenosis: No


Hx Unsteady Gait: No





- GASTROINTESTINAL


Hx Gastrointestinal Disorders: No


Hx Colostomy: No


Hx Crohn's Disease: No


Hx Diverticulitis: No


Hx Gall Bladder Disease: No


Hx Gastroesophageal Reflux: No


Hx Ileostomy: No


Hx Liver Failure: No


HX Swallowing Problems: No


Hx Ulcer: No





- GENITOURINARY/GYNECOLOGICAL


Hx Genitourinary Disorders: No


Hx Hematuria: No


Hx Incontinence: No


Hx Sexually Transmitted Disorders: No


Hx Urinary Tract Infection: No





- PSYCHIATRIC


Hx Psychophysiologic Disorder: No


Hx Anxiety: No


Hx Bipolar Disorder: No


Hx Depression: No


Hx Emotional Abuse: No


Hx Hallucinations: No


Hx Panic Symptoms: No


Hx Paranoia: No


Hx Post Traumatic Stress Disorder: No


Hx Psychosis: No


Hx Physical Abuse: No


Hx Schizophrenia: No


Hx Sexual Abuse: No


Hx Substance Use: No





- SURGICAL HISTORY


Hx Surgeries: No


Hx Amputation: No


Hx Appendectomy: Yes


Hx Cardiac Catheterization: No


Hx Cholecystectomy: No


Hx Coronary Stent: No


Hx Gastric Bypass Surgery: No


Hx Hysterectomy: No


Hx Joint Replacement: No


Hx Kidney Transplant: No


Hx Liver Transplant: No


Hx Mastectomy: No


Hx Musculoskeletal Surgery: No


Hx Open Heart Surgery: No


Hx Orthopedic Surgery: No


Hx Splenectomy: No


Hx Valve Replacement: No





- ANESTHESIA


Hx Anesthesia: Yes


Hx Anesthesia Reactions: No


Hx Malignant Hyperthermia: No





Meds


Allergies/Adverse Reactions: 


                                    Allergies











Allergy/AdvReac Type Severity Reaction Status Date / Time


 


No Known Allergies Allergy   Verified 01/19/18 01:20














Physical Exam





- Constitutional


Appears: Non-toxic, No Acute Distress





- Head Exam


Head Exam: ATRAUMATIC, NORMAL INSPECTION, NORMOCEPHALIC





- Eye Exam


Eye Exam: Normal appearance.  absent: Conjunctival injection, Scleral icterus





- ENT Exam


ENT Exam: Mucous Membranes Dry





- Respiratory Exam


Respiratory Exam: NORMAL BREATHING PATTERN.  absent: Accessory Muscle Use, Rale

s, Rhonchi, Wheezes, Respiratory Distress





- Cardiovascular Exam


Cardiovascular Exam: +S1, +S2





- GI/Abdominal Exam


GI & Abdominal Exam: Normal Bowel Sounds, Soft.  absent: Firm, Guarding, 

Tenderness





- Rectal Exam


Rectal Exam: Deferred





- Extremities Exam


Extremities exam: Positive for: tenderness


Additional comments: 





RLE in cast


tender to palpation





- Neurological Exam


Neurological exam: Alert





- Psychiatric Exam


Psychiatric exam: Normal Affect, Normal Mood





- Skin


Skin Exam: Dry, Intact





Results





- Vital Signs


Recent Vital Signs: 





                                Last Vital Signs











Temp  98.3 F   12/03/18 23:12


 


Pulse  74   12/03/18 23:12


 


Resp  18   12/04/18 00:51


 


BP  121/68   12/03/18 23:12


 


Pulse Ox  98   12/03/18 23:12














- Labs


Result Diagrams: 


                                 12/04/18 07:40





                                 12/04/18 07:40


Labs: 





                         Laboratory Results - last 24 hr











  12/03/18 12/03/18





  12:40 12:40


 


WBC   9.7


 


RBC   3.79


 


Hgb   11.2 L D


 


Hct   35.7 L


 


MCV   94.2


 


MCH   29.6


 


MCHC   31.4


 


RDW   15.0 H


 


Plt Count   140


 


MPV   10.8


 


Gran %   66.7


 


Lymph % (Auto)   13.8 L


 


Mono % (Auto)   19.1 H


 


Eos % (Auto)   0.3 L


 


Baso % (Auto)   0.1


 


Gran #   6.44


 


Lymph # (Auto)   1.3


 


Mono # (Auto)   1.8 H


 


Eos # (Auto)   0.0


 


Baso # (Auto)   0.01


 


Sodium  139 


 


Potassium  3.8 


 


Chloride  105 


 


Carbon Dioxide  28 


 


Anion Gap  9 L 


 


BUN  17 


 


Creatinine  0.6 L 


 


Est GFR ( Amer)  > 60 


 


Est GFR (Non-Af Amer)  > 60 


 


Random Glucose  109 


 


Calcium  9.1 


 


Total Bilirubin  0.8 


 


AST  19 


 


ALT  26 


 


Alkaline Phosphatase  73 


 


Total Protein  6.7 


 


Albumin  3.5 


 


Globulin  3.2 


 


Albumin/Globulin Ratio  1.1 














Assessment & Plan





- Assessment and Plan (Free Text)


Assessment: 





84yo female PMHx HTN, osteopenia, hypothyroidism, and dementia presents with 

right foot pain for 3 days. Patient admitted to [facility] for further management 

of suspected cellulitis. In light of patient's fall, head CT was ordered which 

was unremarkable for acute hemorrhage. In light of patient's unilateral RLE 

pain, swelling, and erythema, venous dopplers were ordered which was negative 

for DVT in the RLE. Bloodwork on admission noted- will follow up blood and urine

cultures. Patient was given 1 dose of Vancomycin and Zosyn in the ER and ID Dr. Rivera and Podiatry Dr. Fletcher were consulted. Patient had x-rays of right 

ankle which showed no fracture and right foot xray showed mild to moderate 

hallux valgus. Podiatry placed a posterior splint to the RLE and educated 

patient on RICE protocol. CT RLE ordered revealed no evidence of fractures; R 

foot and R ankle MRI ordered by podiatry to rule out soft tissue damage. Patient

on Vanc and Cefepime as per ID and home HTN, hypothyroid, and HLD medications 

continued. Will follow up arterial studies to r/o PVD. At this time patient to 

be non weight bearing to RLE. PT screen ordered. I had a long conversation with 

patient's daughter, Sneha regarding patient disposition and all questions and 

concerns were answered thoroughly. Daughter requesting that the patient be 

eventually discharged to [facility] for conditioning when patient is medically 

optimized and would like to speak with case management regarding this.





Discussed with Dr. Kimberly Tyler PGY3





<Matias Collins S - Last Filed: 12/04/18 19:24>





Results





- Vital Signs


Recent Vital Signs: 





                                Last Vital Signs











Temp  98.1 F   12/04/18 06:00


 


Pulse  70   12/04/18 06:00


 


Resp  18   12/04/18 06:00


 


BP  106/54 L  12/04/18 11:21


 


Pulse Ox  95   12/04/18 06:00














- Labs


Result Diagrams: 


                                 12/04/18 07:40





                                 12/04/18 07:40


Labs: 





                         Laboratory Results - last 24 hr











  12/04/18 12/04/18 12/04/18





  07:40 07:40 07:40


 


WBC  7.3  D  


 


RBC  3.52  


 


Hgb  10.6 L  


 


Hct  33.9 L  


 


MCV  96.3  


 


MCH  30.1  


 


MCHC  31.3  


 


RDW  15.4 H  


 


Plt Count  126  


 


MPV  12.3 H  


 


Gran %  64.8  


 


Lymph % (Auto)  17.1 L  


 


Mono % (Auto)  16.8 H  


 


Eos % (Auto)  1.2 L  


 


Baso % (Auto)  0.1  


 


Gran #  4.69  


 


Lymph # (Auto)  1.2  


 


Mono # (Auto)  1.2 H  


 


Eos # (Auto)  0.1  


 


Baso # (Auto)  0.01  


 


ESR  63 H  


 


PT    14.8 H


 


INR    1.28


 


APTT    29.6


 


Sodium   141 


 


Potassium   3.5 L 


 


Chloride   108 H 


 


Carbon Dioxide   29 


 


Anion Gap   8 L 


 


BUN   12 


 


Creatinine   0.6 L 


 


Est GFR ( Amer)   > 60 


 


Est GFR (Non-Af Amer)   > 60 


 


Random Glucose   87 


 


Hemoglobin A1c   


 


Calcium   8.5 


 


Phosphorus   3.4 


 


Magnesium   2.1 


 


Total Bilirubin   0.6 


 


AST   19 


 


ALT   24 


 


Alkaline Phosphatase   66 


 


C-Reactive Protein   154.40 H 


 


Total Protein   5.9 


 


Albumin   3.0 


 


Globulin   2.9 


 


Albumin/Globulin Ratio   1.0 L 


 


Procalcitonin   


 


Urine Color   


 


Urine Appearance   


 


Urine pH   


 


Ur Specific Gravity   


 


Urine Protein   


 


Urine Glucose (UA)   


 


Urine Ketones   


 


Urine Blood   


 


Urine Nitrate   


 


Urine Bilirubin   


 


Urine Urobilinogen   


 


Ur Leukocyte Esterase   


 


Urine RBC   


 


Urine WBC   


 


Ur Epithelial Cells   


 


Urine Bacteria   














  12/04/18 12/04/18 12/04/18





  07:40 11:07 15:31


 


WBC   


 


RBC   


 


Hgb   


 


Hct   


 


MCV   


 


MCH   


 


MCHC   


 


RDW   


 


Plt Count   


 


MPV   


 


Gran %   


 


Lymph % (Auto)   


 


Mono % (Auto)   


 


Eos % (Auto)   


 


Baso % (Auto)   


 


Gran #   


 


Lymph # (Auto)   


 


Mono # (Auto)   


 


Eos # (Auto)   


 


Baso # (Auto)   


 


ESR   


 


PT   


 


INR   


 


APTT   


 


Sodium   


 


Potassium   


 


Chloride   


 


Carbon Dioxide   


 


Anion Gap   


 


BUN   


 


Creatinine   


 


Est GFR ( Amer)   


 


Est GFR (Non-Af Amer)   


 


Random Glucose   


 


Hemoglobin A1c   5.6 


 


Calcium   


 


Phosphorus   


 


Magnesium   


 


Total Bilirubin   


 


AST   


 


ALT   


 


Alkaline Phosphatase   


 


C-Reactive Protein   


 


Total Protein   


 


Albumin   


 


Globulin   


 


Albumin/Globulin Ratio   


 


Procalcitonin  0.25  


 


Urine Color    Yellow


 


Urine Appearance    Clear


 


Urine pH    7.0


 


Ur Specific Gravity    1.015


 


Urine Protein    Negative


 


Urine Glucose (UA)    Negative


 


Urine Ketones    Negative


 


Urine Blood    Large H


 


Urine Nitrate    Negative


 


Urine Bilirubin    Negative


 


Urine Urobilinogen    1.0 H


 


Ur Leukocyte Esterase    Trace H


 


Urine RBC    2 - 5


 


Urine WBC    1 - 3


 


Ur Epithelial Cells    0 - 2


 


Urine Bacteria    Neg














Assessment & Plan





- Assessment and Plan (Free Text)


Assessment: 





Pt seen and examined. I reviewed the note of the medical resident and I agree 

with the note including the assessment and plan. I reviewed the medications and 

last labs. Pt with R foot cellulitis and edema. She had a fall and then 

developed R foot pain. She was seen by podiatry and ID. LE dopplers were 

negative for DVT. She will need arterial dopplers done for evaluation of PAD. 

She is non-wt bearing at this time. She has Dementia- Alz type mild. Resident 

did speak with the daughter. Hypothyroidism will be treated with synthroid. The 

pt will most likely need LESLI. She is eating and in no pain. Her R leg was 

wrapped by podiatry.

## 2018-12-04 NOTE — RAD
Date of service: 



12/03/2018



PROCEDURE:  Right Ankle Radiographs.



HISTORY:

 R/in or out fracture R ankle 



COMPARISON:

None available.



FINDINGS:



BONES:

Normal. No fracture. 



JOINTS:

Normal. No osteoarthritis. Ankle mortise maintained. Talar dome intact



SOFT TISSUES:

Normal. 



OTHER FINDINGS:

None.



IMPRESSION:

Normal right ankle radiographs.

## 2018-12-04 NOTE — CT
Date of service: 



12/03/2018



PROCEDURE:  CT of the right foot



HISTORY:

R/in or out fracture Right foot



COMPARISON:

Plain films same day



TECHNIQUE:





Radiation dose:



Total exam DLP = 347.78 mGy-cm.



This CT exam was performed using one or more of the following dose 

reduction techniques: Automated exposure control, adjustment of the 

mA and/or kV according to patient size, and/or use of iterative 

reconstruction technique.



FINDINGS:

There is no evidence of fracture.



Degenerative changes are seen at the talonavicular joint.



Diffuse subcutaneous edema is seen over the dorsum of the foot.  This 

could represent passive edema or cellulitis.



The report concurs with the preliminary [facility] report



IMPRESSION:

No evidence of fracture

## 2018-12-05 LAB
% IRON SATURATION: 11 % (ref 20–55)
ALBUMIN SERPL-MCNC: 3 G/DL (ref 3–4.8)
ALBUMIN/GLOB SERPL: 1 {RATIO} (ref 1.1–1.8)
ALT SERPL-CCNC: 27 U/L (ref 7–56)
AST SERPL-CCNC: 22 U/L (ref 14–36)
BASOPHILS # BLD AUTO: 0.01 K/MM3 (ref 0–2)
BASOPHILS NFR BLD: 0.2 % (ref 0–3)
BASOPHILS NFR BLD: 1 % (ref 0–1)
BUN SERPL-MCNC: 9 MG/DL (ref 7–21)
CALCIUM SERPL-MCNC: 8.4 MG/DL (ref 8.4–10.5)
EOSINOPHIL # BLD: 0.1 10*3/UL (ref 0–0.7)
EOSINOPHIL NFR BLD: 1.9 % (ref 1.5–5)
EOSINOPHIL NFR BLD: 2 % (ref 0–3)
ERYTHROCYTE [DISTWIDTH] IN BLOOD BY AUTOMATED COUNT: 14.9 % (ref 11.5–14.5)
FERRITIN SERPL-MCNC: 45.6 NG/ML
FOLATE SERPL-MCNC: > 20 NG/ML
GFR NON-AFRICAN AMERICAN: > 60
GRANULOCYTES # BLD: 3.76 10*3/UL (ref 1.4–6.5)
GRANULOCYTES NFR BLD: 58 % (ref 50–68)
HGB BLD-MCNC: 10.7 G/DL (ref 12–16)
IRON SERPL-MCNC: 30 UG/DL (ref 45–180)
LYMPHOCYTE: 25 % (ref 22–35)
LYMPHOCYTES # BLD: 1.3 10*3/UL (ref 1.2–3.4)
LYMPHOCYTES NFR BLD AUTO: 19.4 % (ref 22–35)
MCH RBC QN AUTO: 29.6 PG (ref 25–35)
MCHC RBC AUTO-ENTMCNC: 31.1 G/DL (ref 31–37)
MCV RBC AUTO: 95 FL (ref 80–105)
MONOCYTE: 8 % (ref 1–6)
MONOCYTES # BLD AUTO: 1.3 10*3/UL (ref 0.1–0.6)
MONOCYTES NFR BLD: 20.5 % (ref 1–6)
NEUTROPHILS NFR BLD AUTO: 63 % (ref 50–70)
NEUTS BAND NFR BLD: 1 % (ref 0–2)
PLATELET # BLD EST: NORMAL 10*3/UL
PLATELET # BLD: 152 10^3/UL (ref 120–450)
PMV BLD AUTO: 11.4 FL (ref 7–11)
RBC # BLD AUTO: 3.62 10^6/UL (ref 3.5–6.1)
TIBC SERPL-MCNC: 262 UG/DL (ref 265–497)
VIT B12 SERPL-MCNC: 666 PG/ML (ref 239–931)
WBC # BLD AUTO: 6.5 10^3/UL (ref 4.5–11)

## 2018-12-05 RX ADMIN — CEFEPIME SCH MLS/HR: 1 INJECTION, SOLUTION INTRAVENOUS at 22:56

## 2018-12-05 RX ADMIN — CEFEPIME SCH MLS/HR: 1 INJECTION, SOLUTION INTRAVENOUS at 05:10

## 2018-12-05 RX ADMIN — VANCOMYCIN HYDROCHLORIDE SCH MLS/HR: 1 INJECTION, POWDER, LYOPHILIZED, FOR SOLUTION INTRAVENOUS at 14:29

## 2018-12-05 RX ADMIN — CEFEPIME SCH MLS/HR: 1 INJECTION, SOLUTION INTRAVENOUS at 14:29

## 2018-12-05 RX ADMIN — VANCOMYCIN HYDROCHLORIDE SCH MLS/HR: 1 INJECTION, POWDER, LYOPHILIZED, FOR SOLUTION INTRAVENOUS at 22:57

## 2018-12-05 NOTE — CP.PCM.PN
Subjective





- Date & Time of Evaluation


Date of Evaluation: 12/05/18


Time of Evaluation: 09:50





- Subjective


Subjective: 





Afebrile, not in distress, less pain in the foot.





Objective





- Vital Signs/Intake and Output


Vital Signs (last 24 hours): 


                                        











Temp Pulse Resp BP Pulse Ox


 


 98.1 F   65   20   122/68   94 L


 


 12/05/18 14:00  12/05/18 14:00  12/05/18 14:00  12/05/18 14:00  12/05/18 14:00











- Medications


Medications: 


                               Current Medications





Amlodipine Besylate (Norvasc)  10 mg PO DAILY [facility]


   Last Admin: 12/05/18 11:52 Dose:  10 mg


Atorvastatin Calcium (Lipitor)  10 mg PO DIN [facility]


   Last Admin: 12/04/18 17:09 Dose:  10 mg


Ferrous Sulfate (Feosol)  324 mg PO BID [facility]


Vancomycin HCl (Vancomycin 1gm)  1 gm in 250 mls @ 167 mls/hr IVPB Q12H [facility]; 

Protocol


   Last Admin: 12/05/18 14:29 Dose:  167 mls/hr


Cefepime HCl (Maxipime 1gm)  1 gm in 100 mls @ 100 mls/hr IVPB Q8 [facility]; Protocol


   Last Admin: 12/05/18 14:29 Dose:  100 mls/hr


Levothyroxine Sodium (Synthroid)  100 mcg PO DAILY [facility]


   Last Admin: 12/05/18 09:22 Dose:  100 mcg


Lisinopril (Zestril)  2.5 mg PO DAILY [facility]


   Last Admin: 12/05/18 09:22 Dose:  2.5 mg











- Labs


Labs: 


                                        





                                 12/05/18 06:35 





                                 12/05/18 06:35 





                                        











PT  14.8 SECONDS (9.4-12.5)  H  12/04/18  07:40    


 


INR  1.28   12/04/18  07:40    


 


APTT  29.6 Seconds (25.1-36.5)   12/04/18  07:40    














- Constitutional


Appears: Chronically Ill





- Head Exam


Head Exam: NORMAL INSPECTION





- Respiratory Exam


Respiratory Exam: Decreased Breath Sounds





- Cardiovascular Exam


Cardiovascular Exam: +S1, +S2





- GI/Abdominal Exam


GI & Abdominal Exam: Soft.  absent: Tenderness





- Extremities Exam


Additional comments: 





right foot with dressings in place





Assessment and Plan





- Assessment and Plan (Free Text)


Plan: 





Assessment


Right foot cellulitis, with no evidence of osteomyelitis on MRI


HTN


osteopenia


hypothyroidism


dementia





Plan


continue Vancomycin and Cefepime day 2 for 7-10 days of therapy

## 2018-12-05 NOTE — CP.PCM.PN
<Long Dougherty - Last Filed: 12/05/18 17:05>





Subjective





- Date & Time of Evaluation


Date of Evaluation: 12/05/18


Time of Evaluation: 12:47





- Subjective


Subjective: 








Podiatry progress note for attending Dr. Leyva:





82 year old female patient  seen and evaluated in the bedside for pain, redness,

bruising and swelling in her right foot and ankle. Patient states that her foot 

pain is mild now. Patient denies any overnight chest pain, shortness of breath, 

abdominal pain, fevers, chills, nausea, vomiting, diarrhea, trauma recently.  

She denies any other pedal complaint recently. 




















Objective





- Vital Signs/Intake and Output


Vital Signs (last 24 hours): 


                                        











Temp Pulse Resp BP Pulse Ox


 


 97.9 F   64   18   119/73   97 


 


 12/05/18 06:00  12/05/18 09:22  12/05/18 06:00  12/05/18 11:52  12/05/18 06:00








Intake and Output: 


                                        











 12/05/18 12/05/18





 06:59 18:59


 


Intake Total 360 


 


Balance 360 














- Medications


Medications: 


                               Current Medications





Amlodipine Besylate (Norvasc)  10 mg PO DAILY Replaced by [facility]


   Last Admin: 12/05/18 11:52 Dose:  10 mg


Atorvastatin Calcium (Lipitor)  10 mg PO DIN Replaced by [facility]


   Last Admin: 12/04/18 17:09 Dose:  10 mg


Vancomycin HCl (Vancomycin 1gm)  1 gm in 250 mls @ 167 mls/hr IVPB Q12H [facility]; 

Protocol


   Last Admin: 12/04/18 21:23 Dose:  167 mls/hr


Cefepime HCl (Maxipime 1gm)  1 gm in 100 mls @ 100 mls/hr IVPB Q8 [facility]; Protocol


   Last Admin: 12/05/18 05:10 Dose:  100 mls/hr


Levothyroxine Sodium (Synthroid)  100 mcg PO DAILY [facility]


   Last Admin: 12/05/18 09:22 Dose:  100 mcg


Lisinopril (Zestril)  2.5 mg PO DAILY [facility]


   Last Admin: 12/05/18 09:22 Dose:  2.5 mg











- Labs


Labs: 


                                        





                                 12/05/18 06:35 





                                 12/05/18 06:35 





                                        











PT  14.8 SECONDS (9.4-12.5)  H  12/04/18  07:40    


 


INR  1.28   12/04/18  07:40    


 


APTT  29.6 Seconds (25.1-36.5)   12/04/18  07:40    














- Constitutional


Appears: Well, Non-toxic, No Acute Distress





- Head Exam


Head Exam: ATRAUMATIC, NORMOCEPHALIC





- Extremities Exam


Additional comments: 





B/L LE focused exam:





Vasc: DP 2/4, PT 1/4 due to perimalleolar edema. Cap refill < 3 sec to all 

digits. Temp gradient warm to cool from proximal to distal on the left side and 

warm to warm from proximal to distal. Right moderate non pitting perimalleolar 

edema. Erythema noted in the R foot and ankle but less than yesterday. 

Superficial varicosities noted on the left LE.





Neuro: Gross and protective sensations are intact b/l.





Derm: Bruising noted to the R 1st and 2nd toes.   Erythema noted in the R foot 

and ankle but less than yesterday. No open lesion. no drainage b/l.





MSK: Mild Pain on palpating the Right perimalleolar area. Pain with ROM of the R

foot and ankle. Pain on palpating the R foot.   Muscle power intact to all 

groups 5/5 b/l.





























- Neurological Exam


Neurological Exam: Alert, Awake





Assessment and Plan





- Assessment and Plan (Free Text)


Assessment: 





82 y/o F patient seen and evaluated in the bedside for Right foot and ankle 

pain, redness, bruising and swelling in her right foot and ankle. 























Plan: 





- Patient seen and evaluated at the bedside in the ED with Dr. Leyva


- Plan discussed in details with attending Dr. Leyva


- Charts, labs and vitals reviewed: Afebrile, no leukocytosis


- R 3 views foot x-ray; official report Hallux valgus.


- R3 views ankle x-ray; Official report no osseous anomalies.


- R venous duplex; No evidence of DVT.


- R LE CT scan; No evidence of fractures, R TN joint degenerative changes.


- R foot and ankle MRI; subcutaneous edema over dorsum over foot with no 

evidence of osteomyelitis.


- Elevated ESR 63 and .4 noted. 


- Discontinue Posterior splint and modified Tripathi compression.


- Applied UNNA boot with a layer or wibrol and Coban.


- Continue RICE protocol.


- Patient can bear weight as tolerated to the RLE.


- Ordered pair of surgical shoes.


- Patient to ambulate in the surgical shoes all the times.


- Continue IV abx Cefepime and Vancomycin as per ID. 


- Patient to be evaluated for TCU transfer.


- Podiatry will continue to follow up the patient while in house.








<Aylin Leyva - Last Filed: 12/07/18 14:58>





Objective





- Vital Signs/Intake and Output


Vital Signs (last 24 hours): 


                                        











Temp Pulse Resp BP Pulse Ox


 


 98.6 F   65   18   109/62   100 


 


 12/06/18 14:00  12/06/18 14:00  12/06/18 14:00  12/06/18 14:00  12/06/18 14:00











- Labs


Labs: 


                                        





                                 12/06/18 07:20 





                                 12/06/18 07:20 





                                        











PT  14.8 SECONDS (9.4-12.5)  H  12/04/18  07:40    


 


INR  1.28   12/04/18  07:40    


 


APTT  29.6 Seconds (25.1-36.5)   12/04/18  07:40    














Attending/Attestation





- Attestation


I have personally seen and examined this patient.: Yes


I have fully participated in the care of the patient.: Yes


I have reviewed all pertinent clinical information, including history, physical 

exam and plan: Yes


Notes (Text): 





12/07/18 14:56


pt seen with family at bedside; discussion with pt and her daughter who is a 

nurse; we did xrays CAT scan and MRI no fracture or dislocation of foot or ankle

was noted; the wound on the toe is superficial with no signs of fluctuance or 

abcess; pt family would like her to go to [facility] and then home; I told them it was

up to the PT therapist and if she is not a candidate she may need to go to a 

subacute; unna boot coban and surgical shoe - can ambulate to tolerance

## 2018-12-05 NOTE — CP.PCM.PCO
Physician Communication Note





- Physician Communication Note


Physician Communication Note: will need 7-10 days of IV Antibx for cellulitis 

right foot, no osteo on MRI

## 2018-12-05 NOTE — CP.PCM.PN
<Yelena Tyler - Last Filed: 12/05/18 14:32>





Subjective





- Date & Time of Evaluation


Date of Evaluation: 12/05/18


Time of Evaluation: 07:15





- Subjective


Subjective: 





Pgy3 IM Progress note for Dr. Collins service





Patient seen and examined at bedside. As per nursing, patient was noncompliant 

regarding non weight bearing status and insisted on ambulating to the bathroom. 

Patient has bedside commode but refused to use it. Patient denied any pain in 

RLE overnight and did not request any pain medications. This AM patient did not 

want to speak to me but agreed to be examined. 





Objective





- Vital Signs/Intake and Output


Vital Signs (last 24 hours): 


                                        











Temp Pulse Resp BP Pulse Ox


 


 97 F L  52 L  20   108/60   96 


 


 12/04/18 22:15  12/04/18 22:15  12/04/18 22:15  12/04/18 22:15  12/04/18 22:15








Intake and Output: 


                                        











 12/04/18 12/05/18





 18:59 06:59


 


Intake Total  360


 


Balance  360














- Medications


Medications: 


                               Current Medications





Amlodipine Besylate (Norvasc)  10 mg PO DAILY [facility]


   Last Admin: 12/04/18 11:21 Dose:  Not Given


Atorvastatin Calcium (Lipitor)  10 mg PO DIN [facility]


   Last Admin: 12/04/18 17:09 Dose:  10 mg


Vancomycin HCl (Vancomycin 1gm)  1 gm in 250 mls @ 167 mls/hr IVPB Q12H [facility]; 

Protocol


   Last Admin: 12/04/18 21:23 Dose:  167 mls/hr


Cefepime HCl (Maxipime 1gm)  1 gm in 100 mls @ 100 mls/hr IVPB Q8 [facility]; Protocol


   Last Admin: 12/05/18 05:10 Dose:  100 mls/hr


Levothyroxine Sodium (Synthroid)  100 mcg PO DAILY [facility]


   Last Admin: 12/04/18 11:20 Dose:  100 mcg


Lisinopril (Zestril)  2.5 mg PO DAILY [facility]


   Last Admin: 12/04/18 11:21 Dose:  Not Given











- Labs


Labs: 


                                        





                                 12/04/18 07:40 





                                 12/04/18 07:40 





                                        











PT  14.8 SECONDS (9.4-12.5)  H  12/04/18  07:40    


 


INR  1.28   12/04/18  07:40    


 


APTT  29.6 Seconds (25.1-36.5)   12/04/18  07:40    














- Constitutional


Appears: Non-toxic, No Acute Distress





- Head Exam


Head Exam: ATRAUMATIC, NORMAL INSPECTION, NORMOCEPHALIC





- Eye Exam


Eye Exam: EOMI, Normal appearance.  absent: Conjunctival injection, Scleral 

icterus





- ENT Exam


ENT Exam: Mucous Membranes Moist





- Respiratory Exam


Respiratory Exam: Clear to Ausculation Bilateral, NORMAL BREATHING PATTERN.  

absent: Accessory Muscle Use, Rales, Rhonchi, Wheezes, Respiratory Distress





- Cardiovascular Exam


Cardiovascular Exam: +S1, +S2





- GI/Abdominal Exam


GI & Abdominal Exam: Soft, Normal Bowel Sounds.  absent: Firm, Guarding, Rigid, 

Tenderness





- Rectal Exam


Rectal Exam: Deferred





- Extremities Exam


Additional comments: 





RLE in posterior splint





- Neurological Exam


Neurological Exam: Alert, Awake





- Psychiatric Exam


Psychiatric exam: Normal Affect, Normal Mood





- Skin


Skin Exam: Dry, Intact





Assessment and Plan





- Assessment and Plan (Free Text)


Assessment: 





1. RLE cellulitis


2. Iron deficient anemia


3. Gait dysfunction


4. Hypokalemia


5. Hx of HTN


6. Hx of Osteoporosis


7. Hx of Hypothyroidism


8. Hx of Dementia- Alzheimer's type


9. Hearing impairment


Plan: 





Continue IV abx Cefepime and Vancomycin as per ID. Elevated ESR 63 and .4

noted. RLE MRI showed subq edema over dorsum over foot with no evidence of 

osteomyelitis. Blood culture prelim neg x 2. Arterial studies ordered- cannot be

completed at this time due to splint. Continue RICE protocol and must educate 

patient and family on importance of NWB status at this time. Imaging of RLE 

showed no fracture and dopplers negative for DVT. Patient anemia studies 

reviewed- started PO iron. U/A on admission had +large blood with no RBCs. CK 

ordered to r/o rhabdo which was < 20. Continue home meds. PT eval pending. 

Appreciate ID and Podiatry reccs. Family requesting patient be discharged to [facility]

when medically optimized. Case management aware.





Discussed with Dr. Kimberly Tyler PGY3





<Matias Collins - Last Filed: 12/06/18 18:56>





Objective





- Vital Signs/Intake and Output


Vital Signs (last 24 hours): 


                                        











Temp Pulse Resp BP Pulse Ox


 


 98.6 F   65   18   109/62   100 


 


 12/06/18 14:00  12/06/18 14:00  12/06/18 14:00  12/06/18 14:00  12/06/18 14:00








Intake and Output: 


                                        











 12/06/18 12/06/18





 06:59 18:59


 


Intake Total 360 


 


Balance 360 














- Medications


Medications: 


                               Current Medications





Amlodipine Besylate (Norvasc)  10 mg PO DAILY [facility]


   Last Admin: 12/06/18 09:44 Dose:  10 mg


Atorvastatin Calcium (Lipitor)  10 mg PO DIN [facility]


   Last Admin: 12/06/18 17:15 Dose:  10 mg


Donepezil HCl (Aricept)  5 mg PO HS [facility]


Ferrous Sulfate (Feosol)  324 mg PO BID [facility]


   Last Admin: 12/06/18 17:15 Dose:  324 mg


Vancomycin HCl (Vancomycin 1gm)  1 gm in 250 mls @ 167 mls/hr IVPB Q12H [facility]; 

Protocol


   Last Admin: 12/06/18 09:43 Dose:  167 mls/hr


Cefepime HCl (Maxipime 1gm)  1 gm in 100 mls @ 100 mls/hr IVPB Q8 [facility]; Protocol


   Last Admin: 12/06/18 14:14 Dose:  100 mls/hr


Levothyroxine Sodium (Synthroid)  100 mcg PO DAILY [facility]


   Last Admin: 12/06/18 09:44 Dose:  100 mcg


Lisinopril (Zestril)  2.5 mg PO DAILY [facility]


   Last Admin: 12/06/18 09:43 Dose:  2.5 mg











- Labs


Labs: 


                                        





                                 12/06/18 07:20 





                                 12/06/18 07:20 





                                        











PT  14.8 SECONDS (9.4-12.5)  H  12/04/18  07:40    


 


INR  1.28   12/04/18  07:40    


 


APTT  29.6 Seconds (25.1-36.5)   12/04/18  07:40    














Assessment and Plan





- Assessment and Plan (Free Text)


Plan: 





Pt seen and examined. I reviewed the note of the medical resident and I agree 

with the note including the assessment and plan. I reviewed the medications and 

last labs. Pt with fall. She will need LESLI for her gait dysfunction. She has R 

foot cellulitis that is improving with Cefepime and Vanco. The pt has R foot 

edema that is improving. MRI did not show osteomyelitis. She has Dementia- Alzh 

type. She at risk of Delerium.

## 2018-12-05 NOTE — MRI
Date of service: 



12/05/2018



PROCEDURE:  MRI of the right foot without contrast



HISTORY:

R foot and ankle pain, swelling and erythema



COMPARISON:





TECHNIQUE:

MRI of the right foot was performed in multiple planes using multiple 

pulse sequences.



FINDINGS:

There is a large amount of subcutaneous edema over the dorsum of the 

foot.  This could be due to cellulitis or passive edema.



There is no marrow edema to suggest osteomyelitis.



There is a mild hallux valgus deformity.  There is no significant 

degeneration



IMPRESSION:

Subcutaneous edema over the dorsum of the foot.  No evidence of 

osteomyelitis

## 2018-12-06 VITALS
OXYGEN SATURATION: 100 % | TEMPERATURE: 98.6 F | RESPIRATION RATE: 18 BRPM | HEART RATE: 65 BPM | SYSTOLIC BLOOD PRESSURE: 109 MMHG | DIASTOLIC BLOOD PRESSURE: 62 MMHG

## 2018-12-06 LAB
ALBUMIN SERPL-MCNC: 3.2 G/DL (ref 3–4.8)
ALBUMIN/GLOB SERPL: 1 {RATIO} (ref 1.1–1.8)
ALT SERPL-CCNC: 30 U/L (ref 7–56)
AST SERPL-CCNC: 22 U/L (ref 14–36)
BASOPHILS # BLD AUTO: 0.01 K/MM3 (ref 0–2)
BASOPHILS NFR BLD: 0.2 % (ref 0–3)
BUN SERPL-MCNC: 12 MG/DL (ref 7–21)
CALCIUM SERPL-MCNC: 8.7 MG/DL (ref 8.4–10.5)
EOSINOPHIL # BLD: 0.1 10*3/UL (ref 0–0.7)
EOSINOPHIL NFR BLD: 2 % (ref 1.5–5)
ERYTHROCYTE [DISTWIDTH] IN BLOOD BY AUTOMATED COUNT: 14.6 % (ref 11.5–14.5)
GFR NON-AFRICAN AMERICAN: > 60
GRANULOCYTES # BLD: 3.69 10*3/UL (ref 1.4–6.5)
GRANULOCYTES NFR BLD: 57.5 % (ref 50–68)
HDLC SERPL-MCNC: 18 MG/DL (ref 29–60)
HGB BLD-MCNC: 11.2 G/DL (ref 12–16)
LDLC SERPL-MCNC: 75 MG/DL (ref 0–129)
LYMPHOCYTES # BLD: 1 10*3/UL (ref 1.2–3.4)
LYMPHOCYTES NFR BLD AUTO: 16.1 % (ref 22–35)
MCH RBC QN AUTO: 29.4 PG (ref 25–35)
MCHC RBC AUTO-ENTMCNC: 31.1 G/DL (ref 31–37)
MCV RBC AUTO: 94.5 FL (ref 80–105)
MONOCYTES # BLD AUTO: 1.6 10*3/UL (ref 0.1–0.6)
MONOCYTES NFR BLD: 24.2 % (ref 1–6)
PLATELET # BLD: 166 10^3/UL (ref 120–450)
PMV BLD AUTO: 11 FL (ref 7–11)
RBC # BLD AUTO: 3.81 10^6/UL (ref 3.5–6.1)
WBC # BLD AUTO: 6.4 10^3/UL (ref 4.5–11)

## 2018-12-06 RX ADMIN — CEFEPIME SCH MLS/HR: 1 INJECTION, SOLUTION INTRAVENOUS at 14:14

## 2018-12-06 RX ADMIN — VANCOMYCIN HYDROCHLORIDE SCH MLS/HR: 1 INJECTION, POWDER, LYOPHILIZED, FOR SOLUTION INTRAVENOUS at 09:43

## 2018-12-06 RX ADMIN — CEFEPIME SCH MLS/HR: 1 INJECTION, SOLUTION INTRAVENOUS at 05:53

## 2018-12-06 NOTE — CP.PCM.PN
Objective





- Vital Signs/Intake and Output


Vital Signs (last 24 hours): 


                                        











Temp Pulse Resp BP Pulse Ox


 


 98.1 F   65   20   122/68   94 L


 


 12/05/18 14:00  12/05/18 14:00  12/05/18 14:00  12/05/18 14:00  12/05/18 14:00








Intake and Output: 


                                        











 12/05/18 12/06/18





 18:59 06:59


 


Intake Total  360


 


Balance  360














- Medications


Medications: 


                               Current Medications





Amlodipine Besylate (Norvasc)  10 mg PO DAILY [facility]


   Last Admin: 12/05/18 11:52 Dose:  10 mg


Atorvastatin Calcium (Lipitor)  10 mg PO DIN [facility]


   Last Admin: 12/04/18 17:09 Dose:  10 mg


Ferrous Sulfate (Feosol)  324 mg PO BID [facility]


Vancomycin HCl (Vancomycin 1gm)  1 gm in 250 mls @ 167 mls/hr IVPB Q12H [facility]; 

Protocol


   Last Admin: 12/05/18 22:57 Dose:  167 mls/hr


Cefepime HCl (Maxipime 1gm)  1 gm in 100 mls @ 100 mls/hr IVPB Q8 [facility]; Protocol


   Last Admin: 12/05/18 22:56 Dose:  100 mls/hr


Levothyroxine Sodium (Synthroid)  100 mcg PO DAILY [facility]


   Last Admin: 12/05/18 09:22 Dose:  100 mcg


Lisinopril (Zestril)  2.5 mg PO DAILY [facility]


   Last Admin: 12/05/18 09:22 Dose:  2.5 mg











- Labs


Labs: 


                                        





                                 12/05/18 06:35 





                                 12/05/18 06:35 





                                        











PT  14.8 SECONDS (9.4-12.5)  H  12/04/18  07:40    


 


INR  1.28   12/04/18  07:40    


 


APTT  29.6 Seconds (25.1-36.5)   12/04/18  07:40

## 2018-12-06 NOTE — CP.PCM.PN
<Long Dougherty - Last Filed: 12/06/18 13:31>





Subjective





- Date & Time of Evaluation


Date of Evaluation: 12/06/18


Time of Evaluation: 13:31





- Subjective


Subjective: 








Podiatry progress note for attending Dr. Leyva:





82 year old female patient  seen and evaluated in the bedside for pain, redness,

bruising and swelling in her right foot and ankle. Patient states that doesn't 

have any pain in her right foot now. Patient denies any overnight shortness of 

breath, fevers, chills, nausea, vomiting, diarrhea, trauma recently.  She denies

any other pedal complaint at this time. Patient was seen ambulating without the 

surgical shoes




















Objective





- Vital Signs/Intake and Output


Vital Signs (last 24 hours): 


                                        











Temp Pulse Resp BP Pulse Ox


 


 98.1 F   69   20   110/64   94 L


 


 12/05/18 14:00  12/06/18 09:43  12/05/18 14:00  12/06/18 09:44  12/05/18 14:00








Intake and Output: 


                                        











 12/06/18 12/06/18





 06:59 18:59


 


Intake Total 360 


 


Balance 360 














- Medications


Medications: 


                               Current Medications





Amlodipine Besylate (Norvasc)  10 mg PO DAILY [facility]


   Last Admin: 12/06/18 09:44 Dose:  10 mg


Atorvastatin Calcium (Lipitor)  10 mg PO DIN [facility]


   Last Admin: 12/04/18 17:09 Dose:  10 mg


Ferrous Sulfate (Feosol)  324 mg PO BID [facility]


   Last Admin: 12/06/18 09:44 Dose:  324 mg


Vancomycin HCl (Vancomycin 1gm)  1 gm in 250 mls @ 167 mls/hr IVPB Q12H [facility]; 

Protocol


   Last Admin: 12/06/18 09:43 Dose:  167 mls/hr


Cefepime HCl (Maxipime 1gm)  1 gm in 100 mls @ 100 mls/hr IVPB Q8 [facility]; Protocol


   Last Admin: 12/06/18 05:53 Dose:  100 mls/hr


Levothyroxine Sodium (Synthroid)  100 mcg PO DAILY [facility]


   Last Admin: 12/06/18 09:44 Dose:  100 mcg


Lisinopril (Zestril)  2.5 mg PO DAILY [facility]


   Last Admin: 12/06/18 09:43 Dose:  2.5 mg











- Labs


Labs: 


                                        





                                 12/06/18 07:20 





                                 12/06/18 07:20 





                                        











PT  14.8 SECONDS (9.4-12.5)  H  12/04/18  07:40    


 


INR  1.28   12/04/18  07:40    


 


APTT  29.6 Seconds (25.1-36.5)   12/04/18  07:40    














- Constitutional


Appears: Well, Non-toxic, No Acute Distress





- Head Exam


Head Exam: ATRAUMATIC, NORMOCEPHALIC





- Extremities Exam


Additional comments: 





 R LE focused exam: UNNA boot to the RLE was C/D/I





Vasc: Cap refill < 3 sec to all digits. 





Neuro: Gross and protective sensations are intact.





Derm: 1st toeswound covered with dry scab surrounded by small area or erythema. 

no drainage noted.





MSK: Mild Pain on palpating the Right perimalleolar area. Pain with ROM of the R

foot and ankle. Pain on palpating the R foot.   Muscle power intact to all 

groups 5/5 b/l.














- Neurological Exam


Neurological Exam: Alert, Awake, Oriented x3





- Psychiatric Exam


Psychiatric exam: Normal Affect, Normal Mood





Assessment and Plan





- Assessment and Plan (Free Text)


Assessment: 








84 y/o F patient seen and evaluated in the bedside for Right foot and ankle 

pain, redness, bruising and swelling in her right foot and ankle (?? cellulitis 

or sprain). 

















Plan: 





- Patient seen and evaluated at the bedside with Dr. Leyva


- Plan discussed in details with attending Dr. Leyva


- Charts, labs and vitals reviewed: Afebrile, no leukocytosis


- R 3 views foot x-ray; official report Hallux valgus.


- R 3 views ankle x-ray; Official report no osseous anomalies.


- R venous duplex; No evidence of DVT.


- R LE CT scan; No evidence of fractures, R TN joint degenerative changes.


- R foot and ankle MRI; subcutaneous edema over dorsum over foot with no 

evidence of osteomyelitis.


- Elevated ESR 63 and .4 noted. 


- Keep R UNNA boot C/D/I


- Continue RICE protocol.


- Patient can bear weight as tolerated to the RLE.


- Patient instructed to ambulate in the surgical shoes all the times.


- Continue IV abx as per ID. 


- Patient to be evaluated for TCU transfer.


- Podiatry will continue to follow up the patient while in house.





<Aylin Leyva - Last Filed: 12/07/18 14:52>





Objective





- Vital Signs/Intake and Output


Vital Signs (last 24 hours): 


                                        











Temp Pulse Resp BP Pulse Ox


 


 98.6 F   65   18   109/62   100 


 


 12/06/18 14:00  12/06/18 14:00  12/06/18 14:00  12/06/18 14:00  12/06/18 14:00











- Labs


Labs: 


                                        





                                 12/06/18 07:20 





                                 12/06/18 07:20 





                                        











PT  14.8 SECONDS (9.4-12.5)  H  12/04/18  07:40    


 


INR  1.28   12/04/18  07:40    


 


APTT  29.6 Seconds (25.1-36.5)   12/04/18  07:40    














Attending/Attestation





- Attestation


I have personally seen and examined this patient.: Yes


I have fully participated in the care of the patient.: Yes


I have reviewed all pertinent clinical information, including history, physical 

exam and plan: Yes

## 2018-12-06 NOTE — PN
DATE:  12/06/2018



SUBJECTIVE:  The patient is seen in bed, in no acute distress, and

nontoxic.



PHYSICAL EXAMINATION:

VITAL SIGNS:  Temperature is 98, blood pressure is 109/60, respiratory rate

20, and heart rate of 65.

HEENT:  Unremarkable.

NECK:  Supple.

LUNGS:  Decreased breath sounds.

HEART:  Normal S1 and S2.

ABDOMEN:  Soft.



LABORATORY DATA:  Reveals a white count of 6.4, hemoglobin of 11, and

platelets of 166.  Chemistry reveals BUN of 12, creatinine 0.6, and

procalcitonin is 0.25.  Urinalysis is noted.  Microbiology reveals the

patient's blood cultures and urine cultures are negative.  Review of orders

reveals the patient to be on cefepime and vancomycin.



ASSESSMENT AND PLAN:  This is an 83-year-old female who was seen earlier

this morning in room 563, bed 2 with her right foot cellulitis.  No

evidence of osteomyelitis at MRI, on day #3 of vancomycin and cefepime,

would complete 7 to 10 days with soft tissue infection in a patient with

hypertension, osteopenia and vancomycin trough level and chemistry should

be followed closely.







__________________________________________

Bal Rivera MD





DD:  12/06/2018 16:46:49

DT:  12/06/2018 17:40:37

Job # [id number]

## 2018-12-06 NOTE — CP.PCM.DIS
<Yelena Tyler - Last Filed: 12/07/18 13:28>





Provider





- Provider


Date of Admission: 


12/03/18 16:00





Attending physician: 


Matias Collins MD





Primary care physician: 


Dr. Jeffries


Consults: 








12/03/18 16:09


Physician Consult Stat 


   Comment: 


   Consulting Provider: Bal Rivera


   Consulting Physician: Bal Rivera


   Reason for Consult: cellulitis, foot/leg





12/03/18 16:10


Physician Consult Stat 


   Comment: 


   Consulting Provider: Aylin Leyva


   Consulting Physician: Aylin Leyva


   Reason for Consult: cellulitis, foot  wound, foot





12/04/18 14:18


Case Management Referral Routine 


   Comment: pls reach out daughter Sneha [phone] if need


   Physician Instructions: 


   Reason For Exam: d/c plan to [facility] when patient is medically optimize


   Reason for Referral: Discharge Planning





12/05/18 16:39


TCU [Evaluation for TRCU] Routine 


   Comment: 


   Physician Instructions: 


   Reason For Exam: iv antbx, strengthening











Time Spent in preparation of Discharge (in minutes): 45





Hospital Course





- Lab Results


Lab Results: 


                                  Micro Results





12/03/18 13:00   Blood   Blood Culture - Preliminary


                            NO GROWTH AFTER 3 DAYS


12/03/18 12:40   Blood   Blood Culture - Preliminary


                            NO GROWTH AFTER 3 DAYS


12/04/18 15:31   Urine,Clean Catch   Urine Culture - Final


                            No Growth (<1,000 CFU/ML)





                             Most Recent Lab Values











WBC  6.4 10^3/uL (4.5-11.0)   12/06/18  07:20    


 


RBC  3.81 10^6/uL (3.5-6.1)   12/06/18  07:20    


 


Hgb  11.2 g/dL (12.0-16.0)  L  12/06/18  07:20    


 


Hct  36.0 % (36.0-48.0)   12/06/18  07:20    


 


MCV  94.5 fl (80.0-105.0)   12/06/18  07:20    


 


MCH  29.4 pg (25.0-35.0)   12/06/18  07:20    


 


MCHC  31.1 g/dl (31.0-37.0)   12/06/18  07:20    


 


RDW  14.6 % (11.5-14.5)  H  12/06/18  07:20    


 


Plt Count  166 10^3/uL (120.0-450.0)   12/06/18  07:20    


 


MPV  11.0 fl (7.0-11.0)   12/06/18  07:20    


 


Gran %  57.5 % (50.0-68.0)   12/06/18  07:20    


 


Lymph % (Auto)  16.1 % (22.0-35.0)  L  12/06/18  07:20    


 


Mono % (Auto)  24.2 % (1.0-6.0)  H  12/06/18  07:20    


 


Eos % (Auto)  2.0 % (1.5-5.0)   12/06/18  07:20    


 


Baso % (Auto)  0.2 % (0.0-3.0)   12/06/18  07:20    


 


Gran #  3.69  (1.4-6.5)   12/06/18  07:20    


 


Lymph # (Auto)  1.0  (1.2-3.4)  L  12/06/18  07:20    


 


Mono # (Auto)  1.6  (0.1-0.6)  H  12/06/18  07:20    


 


Eos # (Auto)  0.1  (0.0-0.7)   12/06/18  07:20    


 


Baso # (Auto)  0.01 K/mm3 (0.0-2.0)   12/06/18  07:20    


 


Neutrophils % (Manual)  63 % (50.0-70.0)   12/05/18  06:35    


 


Band Neutrophils %  1 % (0-2)   12/05/18  06:35    


 


Lymphocytes % (Manual)  25 % (22.0-35.0)   12/05/18  06:35    


 


Monocytes % (Manual)  8 % (1.0-6.0)  H  12/05/18  06:35    


 


Eosinophils % (Manual)  2 % (0.0-3.0)   12/05/18  06:35    


 


Basophils % (Manual)  1 % (0.0-1.0)   12/05/18  06:35    


 


Platelet Evaluation  Normal  (NORMAL)   12/05/18  06:35    


 


ESR  63 mm/hr (0.0-20.0)  H  12/04/18  07:40    


 


PT  14.8 SECONDS (9.4-12.5)  H  12/04/18  07:40    


 


INR  1.28   12/04/18  07:40    


 


APTT  29.6 Seconds (25.1-36.5)   12/04/18  07:40    


 


Sodium  139 mmol/L (132-148)   12/06/18  07:20    


 


Potassium  4.0 mmol/L (3.6-5.0)   12/06/18  07:20    


 


Chloride  108 mmol/L ()  H  12/06/18  07:20    


 


Carbon Dioxide  27 mmol/L (21-33)   12/06/18  07:20    


 


Anion Gap  9  (10-20)  L  12/06/18  07:20    


 


BUN  12 mg/dL (7-21)   12/06/18  07:20    


 


Creatinine  0.6 mg/dl (0.7-1.2)  L  12/06/18  07:20    


 


Est GFR ( Amer)  > 60   12/06/18  07:20    


 


Est GFR (Non-Af Amer)  > 60   12/06/18  07:20    


 


Random Glucose  97 mg/dL ()   12/06/18  07:20    


 


Hemoglobin A1c  5.6 % (4.2-6.5)   12/04/18  11:07    


 


Calcium  8.7 mg/dL (8.4-10.5)   12/06/18  07:20    


 


Phosphorus  3.4 mg/dL (2.5-4.5)   12/04/18  07:40    


 


Magnesium  2.1 mg/dL (1.7-2.2)   12/04/18  07:40    


 


Iron  30 ug/dL ()  L  12/05/18  07:00    


 


TIBC  262 ug/dL (265-497)  L  12/05/18  07:00    


 


% Saturation  11 % (20-55)  L  12/05/18  07:00    


 


Transferrin  184.78 mg/dL (206-381)  L  12/05/18  07:00    


 


Ferritin  45.6 ng/mL  12/05/18  07:00    


 


Total Bilirubin  0.6 mg/dL (0.2-1.3)   12/06/18  07:20    


 


AST  22 U/L (14-36)   12/06/18  07:20    


 


ALT  30 U/L (7-56)   12/06/18  07:20    


 


Alkaline Phosphatase  76 U/L ()   12/06/18  07:20    


 


Total Creatine Kinase  < 20 U/L ()  L  12/05/18  07:00    


 


C-Reactive Protein  154.40 mg/L (0.0-9.9)  H  12/04/18  07:40    


 


Total Protein  6.4 g/dL (5.8-8.3)   12/06/18  07:20    


 


Albumin  3.2 g/dL (3.0-4.8)   12/06/18  07:20    


 


Globulin  3.3 gm/dL  12/06/18  07:20    


 


Albumin/Globulin Ratio  1.0  (1.1-1.8)  L  12/06/18  07:20    


 


Triglycerides  102 mg/dL ()   12/06/18  07:20    


 


Cholesterol  97 mg/dL (130-200)  L  12/06/18  07:20    


 


LDL Cholesterol Direct  75 mg/dL (0-129)   12/06/18  07:20    


 


HDL Cholesterol  18 mg/dL (29-60)  L  12/06/18  07:20    


 


Vitamin B12  666 pg/mL (239-931)   12/05/18  07:00    


 


Folate  > 20.0 ng/mL  12/05/18  07:00    


 


Procalcitonin  0.25 NG/ML (0.19-0.49)   12/04/18  07:40    


 


Urine Color  Yellow  (YELLOW)   12/04/18  15:31    


 


Urine Appearance  Clear  (CLEAR)   12/04/18  15:31    


 


Urine pH  7.0  (4.7-8.0)   12/04/18  15:31    


 


Ur Specific Gravity  1.015  (1.005-1.035)   12/04/18  15:31    


 


Urine Protein  Negative mg/dL (<30 mg/dL)   12/04/18  15:31    


 


Urine Glucose (UA)  Negative mg/dL (NEGATIVE)   12/04/18  15:31    


 


Urine Ketones  Negative mg/dL (NEGATIVE)   12/04/18  15:31    


 


Urine Blood  Large  (NEGATIVE)  H  12/04/18  15:31    


 


Urine Nitrate  Negative  (NEGATIVE)   12/04/18  15:31    


 


Urine Bilirubin  Negative  (NEGATIVE)   12/04/18  15:31    


 


Urine Urobilinogen  1.0 E.U./dL (<1 E.U./dL)  H  12/04/18  15:31    


 


Ur Leukocyte Esterase  Trace Tiny/uL (NEGATIVE)  H  12/04/18  15:31    


 


Urine RBC  2 - 5 /hpf (0-2)   12/04/18  15:31    


 


Urine WBC  1 - 3 /hpf (0-6)   12/04/18  15:31    


 


Ur Epithelial Cells  0 - 2 /hpf (0-5)   12/04/18  15:31    


 


Urine Bacteria  Neg  (NEG)   12/04/18  15:31    














- Hospital Course


Hospital Course: 





Upon Admission


82yo female PMHx HTN, osteopenia, hypothyroidism, and dementia presents with 

right foot pain for 3 days. Patient reports she had a mechanical fall and 

tripped on the sidewalk when she hurt her right foot. As per daughter patient 

was walking with her  and was unable to ambulate well and had trouble 

climbing her stairs at home. Daughter reported the patient's R foot was red, 

swollen and tender to touch and the decision was made to call EMS. 





Hospital Course


Patient was admitted to med/surg for further management of suspected cellulitis.

In light of patient's fall, head CT was ordered which was unremarkable for acute

hemorrhage. In light of patient's unilateral RLE pain, swelling, and erythema, 

venous dopplers were ordered which was negative for DVT in the RLE. ID and 

Podiatry were consulted. ID started patient on IV Cefepime and Vancomycin. 

Patient's x-rays of R ankle showed no fracture and R foot showed mild moderate 

hallux valgus. Podiatry placed a posterior splint to his RICE and patient and 

daughter were educated on maintaining RICE protocol. CT RLE revealed no fracture

and R ankle MRI showed some soft tissue swelling. Arterial studies were unable 

to be carried out due to splint. At first patient was placed on non-weight 

bearing restrictions, however, she continued to ambulate to the bathroom and 

denied any pain on ambulation or on physical exam. Patient's anemia work up 

revealed ALDO and she was started on PO iron. As patient improved, her posterior 

splint and modified Tripathi compression was discontinued and an UNNA boot with a 

lyaer of wibrol and Coban was applied by podiatry. Patient's ambulation status 

was changed to bearing weight as tolerated. Physical therapy deemed patient a 

suitable candidate for [facility] vs TCU. Patient's daughter Sneha was spoken to in 

great detail regarding patient's disposition and all questions and concerns were

answered. Patient was approved to be discharged to [facility] and family was

amenable to decision and discharge.





Upon Discharge


[facility] and 

strengthening with physical therapy.





She was prescribed the following medications:


-Vancomycin 1gm IV every 12 hours for 5 more days


-Cefepime 1gm IV every 8 hours for 5 more days


-Feosol 324mg by mouth every 12 hours


-Norvasc 10mg by mouth daily


-Lipitor 10mg by mouth every night


-Lisinopril 2.5mg by mouth daily


-Synthroid 100mcg by mouth daily





Patient was to have RICE protocol applied to her RLE and to bear weight as 

tolerated and to wear the surgical shoe at all times on her RLE. 





Patient and family were told to follow up with her PMD Dr. Jeffries within 7 days 

of discharge from [facility] and if symptoms returned she was to go to her nearest 

Emergency Dept.





Patient and family voiced understanding and agreement to plan.





Please note this is a discharge summary. For full hospital course please refer 

to EMR.





Discharge Exam





- Head Exam


Head Exam: ATRAUMATIC, NORMOCEPHALIC





- Eye Exam


Eye Exam: EOMI, Normal appearance.  absent: Conjunctival injection, Scleral 

icterus





- ENT Exam


ENT Exam: Mucous Membranes Moist





- Neck Exam


Neck exam: Full Rom





- Respiratory Exam


Respiratory Exam: Clear to PA & Lateral, NORMAL BREATHING PATTERN.  absent: 

Accessory Muscle Use, Rales, Rhonchi, Wheezes, Respiratory Distress





- Cardiovascular Exam


Cardiovascular Exam: +S1, +S2





- GI/Abdominal Exam


GI & Abdominal Exam: Normal Bowel Sounds, Soft.  absent: Firm, Guarding, Rigid, 

Tenderness





- Rectal Exam


Rectal Exam: Deferred





- Extremities Exam


Additional comments: 





RLE wrapped 





- Neurological Exam


Neurological exam: Alert





- Psychiatric Exam


Psychiatric exam: Normal Affect, Normal Mood





- Skin


Skin Exam: Dry, Normal Color, Warm





Discharge Plan





- Discharge Medications


Prescriptions: 


RX: Cefepime 1gm in NS 100ml [Maxipime 1gm] 1 gm IVPB Q8H 5 Days  bag


RX: Vancomycin 1 GM [Vancomycin 1GM in Normal Saline Addvantage] 1 gm IVPB Q12 5

Days  bag





- Follow Up Plan


Condition: FAIR


Disposition: REHAB FACILITY/REHAB UNIT


Instructions:  Anemia of Chronic Disease, Hypokalemia (DC), Cellulitis (DC), 

Cellulitis (GEN)


Additional Instructions: 


You are being discharged to [facility] acute rehab for continued care and 

strengthening with physical therapy.





You will be taking the following medications as prescribed:


-Vancomycin 1gm IV every 12 hours for 5 more days


-Cefepime 1gm IV every 8 hours for 5 more days


-Feosol 324mg by mouth every 12 hours


-Norvasc 10mg by mouth daily


-Lipitor 10mg by mouth every night


-Lisinopril 2.5mg by mouth daily


-Synthroid 100mcg by mouth daily





Please continue resting, icing, compressing, and elevating your Right leg. You 

can bear weight on your right leg as it is tolerated. Please walk in the 

surgical shoe at all times.





Please follow up with your primary care doctor Dr. Jeffries within 7 days of 

[facility]





If symptoms return or worsen please visit your nearest Emergency Department.








<Matias Collins - Last Filed: 12/07/18 20:12>





Provider





- Provider


Date of Admission: 


12/03/18 16:00





Attending physician: 


Matias Collins MD





Consults: 








12/03/18 16:09


Physician Consult Stat 


   Comment: 


   Consulting Provider: Bal Rivera


   Consulting Physician: Bal Rivera


   Reason for Consult: cellulitis, foot/leg





12/03/18 16:10


Physician Consult Stat 


   Comment: 


   Consulting Provider: Aylin Leyva


   Consulting Physician: Aylin Leyva


   Reason for Consult: cellulitis, foot  wound, foot





12/04/18 14:18


Case Management Referral Routine 


   Comment: pls reach out daughter Sneha [phone] if need


   Physician Instructions: 


   Reason For Exam: d/c plan to [facility] when patient is medically optimize


   Reason for Referral: Discharge Planning





12/05/18 16:39


TCU [Evaluation for TRCU] Routine 


   Comment: 


   Physician Instructions: 


   Reason For Exam: iv antbx, strengthening














Hospital Course





- Lab Results


Lab Results: 


                                  Micro Results





12/03/18 13:00   Blood   Blood Culture - Preliminary


                            NO GROWTH AFTER 4 DAYS


12/03/18 12:40   Blood   Blood Culture - Preliminary


                            NO GROWTH AFTER 4 DAYS


12/04/18 15:31   Urine,Clean Catch   Urine Culture - Final


                            No Growth (<1,000 CFU/ML)





                             Most Recent Lab Values











WBC  6.4 10^3/uL (4.5-11.0)   12/06/18  07:20    


 


RBC  3.81 10^6/uL (3.5-6.1)   12/06/18  07:20    


 


Hgb  11.2 g/dL (12.0-16.0)  L  12/06/18  07:20    


 


Hct  36.0 % (36.0-48.0)   12/06/18  07:20    


 


MCV  94.5 fl (80.0-105.0)   12/06/18  07:20    


 


MCH  29.4 pg (25.0-35.0)   12/06/18  07:20    


 


MCHC  31.1 g/dl (31.0-37.0)   12/06/18  07:20    


 


RDW  14.6 % (11.5-14.5)  H  12/06/18  07:20    


 


Plt Count  166 10^3/uL (120.0-450.0)   12/06/18  07:20    


 


MPV  11.0 fl (7.0-11.0)   12/06/18  07:20    


 


Gran %  57.5 % (50.0-68.0)   12/06/18  07:20    


 


Lymph % (Auto)  16.1 % (22.0-35.0)  L  12/06/18  07:20    


 


Mono % (Auto)  24.2 % (1.0-6.0)  H  12/06/18  07:20    


 


Eos % (Auto)  2.0 % (1.5-5.0)   12/06/18  07:20    


 


Baso % (Auto)  0.2 % (0.0-3.0)   12/06/18  07:20    


 


Gran #  3.69  (1.4-6.5)   12/06/18  07:20    


 


Lymph # (Auto)  1.0  (1.2-3.4)  L  12/06/18  07:20    


 


Mono # (Auto)  1.6  (0.1-0.6)  H  12/06/18  07:20    


 


Eos # (Auto)  0.1  (0.0-0.7)   12/06/18  07:20    


 


Baso # (Auto)  0.01 K/mm3 (0.0-2.0)   12/06/18  07:20    


 


Neutrophils % (Manual)  63 % (50.0-70.0)   12/05/18  06:35    


 


Band Neutrophils %  1 % (0-2)   12/05/18  06:35    


 


Lymphocytes % (Manual)  25 % (22.0-35.0)   12/05/18  06:35    


 


Monocytes % (Manual)  8 % (1.0-6.0)  H  12/05/18  06:35    


 


Eosinophils % (Manual)  2 % (0.0-3.0)   12/05/18  06:35    


 


Basophils % (Manual)  1 % (0.0-1.0)   12/05/18  06:35    


 


Platelet Evaluation  Normal  (NORMAL)   12/05/18  06:35    


 


ESR  63 mm/hr (0.0-20.0)  H  12/04/18  07:40    


 


PT  14.8 SECONDS (9.4-12.5)  H  12/04/18  07:40    


 


INR  1.28   12/04/18  07:40    


 


APTT  29.6 Seconds (25.1-36.5)   12/04/18  07:40    


 


Sodium  139 mmol/L (132-148)   12/06/18  07:20    


 


Potassium  4.0 mmol/L (3.6-5.0)   12/06/18  07:20    


 


Chloride  108 mmol/L ()  H  12/06/18  07:20    


 


Carbon Dioxide  27 mmol/L (21-33)   12/06/18  07:20    


 


Anion Gap  9  (10-20)  L  12/06/18  07:20    


 


BUN  12 mg/dL (7-21)   12/06/18  07:20    


 


Creatinine  0.6 mg/dl (0.7-1.2)  L  12/06/18  07:20    


 


Est GFR ( Amer)  > 60   12/06/18  07:20    


 


Est GFR (Non-Af Amer)  > 60   12/06/18  07:20    


 


Random Glucose  97 mg/dL ()   12/06/18  07:20    


 


Hemoglobin A1c  5.6 % (4.2-6.5)   12/04/18  11:07    


 


Calcium  8.7 mg/dL (8.4-10.5)   12/06/18  07:20    


 


Phosphorus  3.4 mg/dL (2.5-4.5)   12/04/18  07:40    


 


Magnesium  2.1 mg/dL (1.7-2.2)   12/04/18  07:40    


 


Iron  30 ug/dL ()  L  12/05/18  07:00    


 


TIBC  262 ug/dL (265-497)  L  12/05/18  07:00    


 


% Saturation  11 % (20-55)  L  12/05/18  07:00    


 


Transferrin  184.78 mg/dL (206-381)  L  12/05/18  07:00    


 


Ferritin  45.6 ng/mL  12/05/18  07:00    


 


Total Bilirubin  0.6 mg/dL (0.2-1.3)   12/06/18  07:20    


 


AST  22 U/L (14-36)   12/06/18  07:20    


 


ALT  30 U/L (7-56)   12/06/18  07:20    


 


Alkaline Phosphatase  76 U/L ()   12/06/18  07:20    


 


Total Creatine Kinase  < 20 U/L ()  L  12/05/18  07:00    


 


C-Reactive Protein  154.40 mg/L (0.0-9.9)  H  12/04/18  07:40    


 


Total Protein  6.4 g/dL (5.8-8.3)   12/06/18  07:20    


 


Albumin  3.2 g/dL (3.0-4.8)   12/06/18  07:20    


 


Globulin  3.3 gm/dL  12/06/18  07:20    


 


Albumin/Globulin Ratio  1.0  (1.1-1.8)  L  12/06/18  07:20    


 


Triglycerides  102 mg/dL ()   12/06/18  07:20    


 


Cholesterol  97 mg/dL (130-200)  L  12/06/18  07:20    


 


LDL Cholesterol Direct  75 mg/dL (0-129)   12/06/18  07:20    


 


HDL Cholesterol  18 mg/dL (29-60)  L  12/06/18  07:20    


 


Vitamin B12  666 pg/mL (239-931)   12/05/18  07:00    


 


Folate  > 20.0 ng/mL  12/05/18  07:00    


 


Procalcitonin  0.25 NG/ML (0.19-0.49)   12/04/18  07:40    


 


Urine Color  Yellow  (YELLOW)   12/04/18  15:31    


 


Urine Appearance  Clear  (CLEAR)   12/04/18  15:31    


 


Urine pH  7.0  (4.7-8.0)   12/04/18  15:31    


 


Ur Specific Gravity  1.015  (1.005-1.035)   12/04/18  15:31    


 


Urine Protein  Negative mg/dL (<30 mg/dL)   12/04/18  15:31    


 


Urine Glucose (UA)  Negative mg/dL (NEGATIVE)   12/04/18  15:31    


 


Urine Ketones  Negative mg/dL (NEGATIVE)   12/04/18  15:31    


 


Urine Blood  Large  (NEGATIVE)  H  12/04/18  15:31    


 


Urine Nitrate  Negative  (NEGATIVE)   12/04/18  15:31    


 


Urine Bilirubin  Negative  (NEGATIVE)   12/04/18  15:31    


 


Urine Urobilinogen  1.0 E.U./dL (<1 E.U./dL)  H  12/04/18  15:31    


 


Ur Leukocyte Esterase  Trace Tiny/uL (NEGATIVE)  H  12/04/18  15:31    


 


Urine RBC  2 - 5 /hpf (0-2)   12/04/18  15:31    


 


Urine WBC  1 - 3 /hpf (0-6)   12/04/18  15:31    


 


Ur Epithelial Cells  0 - 2 /hpf (0-5)   12/04/18  15:31    


 


Urine Bacteria  Neg  (NEG)   12/04/18  15:31    














- Hospital Course


Hospital Course: 





Pt seen and examined. I reviewed the note of the medical resident and I agree 

with the note including the assessment and plan. I reviewed the medications and 

last labs. Pt with fall at home. She will be going to [facility]. Daughter was informe

d. She had a R foot cellulitis. She was treated with IV Abx and it had improved.

She has mild Dementia- Alzh and is as risk of delerium. HTN is controlled with 

Norvasc and HTN. D/C.

## 2022-09-23 NOTE — CP.PCM.PN
<Long Dougherty - Last Filed: 12/04/18 12:28>





Subjective





- Date & Time of Evaluation


Date of Evaluation: 12/04/18


Time of Evaluation: 11:32





- Subjective


Subjective: 





Podiatry progress note for attending Dr. Fletcher:





82 year old female patient  seen and evaluated in the bedside for pain, redness,

bruising and swelling in her right foot and ankle. Patient states that her foot 

pain decreased a lot since yesterday. Patient denies any overnight chest pain, 

shortness of breath, abdominal pain, fevers, chills, nausea, vomiting, diarrhea,

trauma recently.  She denies any other pedal complaint recently. 


























Objective





- Vital Signs/Intake and Output


Vital Signs (last 24 hours): 


                                        











Temp Pulse Resp BP Pulse Ox


 


 98.1 F   70   18   106/54 L  95 


 


 12/04/18 06:00  12/04/18 06:00  12/04/18 06:00  12/04/18 11:21  12/04/18 06:00











- Medications


Medications: 


                               Current Medications





Amlodipine Besylate (Norvasc)  10 mg PO DAILY [facility]


   Last Admin: 12/04/18 11:21 Dose:  Not Given


Atorvastatin Calcium (Lipitor)  10 mg PO DIN [facility]


Vancomycin HCl (Vancomycin 1gm)  1 gm in 250 mls @ 167 mls/hr IVPB Q12H [facility]; 

Protocol


   Last Admin: 12/04/18 11:20 Dose:  167 mls/hr


Cefepime HCl (Maxipime 1gm)  1 gm in 100 mls @ 100 mls/hr IVPB Q8 [facility]; Protocol


Levothyroxine Sodium (Synthroid)  100 mcg PO DAILY [facility]


   Last Admin: 12/04/18 11:20 Dose:  100 mcg


Lisinopril (Zestril)  2.5 mg PO DAILY [facility]


   Last Admin: 12/04/18 11:21 Dose:  Not Given











- Labs


Labs: 


                                        





                                 12/04/18 07:40 





                                 12/04/18 07:40 





                                        











PT  14.8 SECONDS (9.4-12.5)  H  12/04/18  07:40    


 


INR  1.28   12/04/18  07:40    


 


APTT  29.6 Seconds (25.1-36.5)   12/04/18  07:40    














- Constitutional


Appears: Well, Non-toxic, No Acute Distress





- Head Exam


Head Exam: ATRAUMATIC, NORMOCEPHALIC





- Extremities Exam


Additional comments: 





B/L LE focused exam:





Vasc: DP 2/4, PT 1/4 due to perimalleolar edema. Cap refill < 3 sec to all 

digits. Temp gradient warm to cool from proximal to distal on the left side and 

warm to warm from proximal to distal. Right moderate non pitting perimalleolar 

edema. Erythema noted in the R foot and ankle but less than yesterday. Supe

rficial varicosities noted on the left LE.





Neuro: Gross and protective sensations are intact b/l.





Derm: Bruising noted to the R 1st and 2nd toes.   Erythema noted in the R foot 

and ankle but less than yesterday. No open lesion. no drainage b/l.





MSK: Mild Pain on palpating the Right perimalleolar area. Pain with ROM of the R

foot and ankle. Pain on palpating the R foot.   Muscle power intact to all 

groups 5/5 b/l.























- Neurological Exam


Neurological Exam: Alert, Awake, Oriented x3





- Psychiatric Exam


Psychiatric exam: Normal Affect, Normal Mood





Assessment and Plan





- Assessment and Plan (Free Text)


Assessment: 





82 y/o F patient seen and evaluated in the bedside for Right foot and ankle 

pain, redness, bruising and swelling in her right foot and ankle. 











Plan: 








- Patient seen and evaluated at the bedside in the ED with Dr. Fletcher.


- Plan discussed in details with attending Dr. Fletcher.


- Charts, labs and vitals reviewed: Afebrile, no leukocytosis


- R 3 views foot x-ray; official report Hallux valgus.


- R3 views ankle x-ray; Official report no osseous anomalies.


- R venous duplex; No evidence of DVT.


- R LE CT scan; No evidence of fractures, R TN joint degenerative changes.


- Ordered R foot and ankle MRI.


- Reapplied posterior splint and modified Tripathi compression to the RLE.


- RICE protocol educated.


- Stay NWB to the RLE.


- Podiatry will continue to follow up the patient while in house.





<Aryan Fletcher - Last Filed: 12/04/18 16:01>





Objective





- Vital Signs/Intake and Output


Vital Signs (last 24 hours): 


                                        











Temp Pulse Resp BP Pulse Ox


 


 98.1 F   70   18   106/54 L  95 


 


 12/04/18 06:00  12/04/18 06:00  12/04/18 06:00  12/04/18 11:21  12/04/18 06:00











- Medications


Medications: 


                               Current Medications





Amlodipine Besylate (Norvasc)  10 mg PO DAILY [facility]


   Last Admin: 12/04/18 11:21 Dose:  Not Given


Atorvastatin Calcium (Lipitor)  10 mg PO DIN EDMUND


Vancomycin HCl (Vancomycin 1gm)  1 gm in 250 mls @ 167 mls/hr IVPB Q12H EDMUND; 

Protocol


   Last Admin: 12/04/18 11:20 Dose:  167 mls/hr


Cefepime HCl (Maxipime 1gm)  1 gm in 100 mls @ 100 mls/hr IVPB Q8 EDMUND; Protocol


   Last Admin: 12/04/18 15:48 Dose:  100 mls/hr


Levothyroxine Sodium (Synthroid)  100 mcg PO DAILY [facility]


   Last Admin: 12/04/18 11:20 Dose:  100 mcg


Lisinopril (Zestril)  2.5 mg PO DAILY [facility]


   Last Admin: 12/04/18 11:21 Dose:  Not Given











- Labs


Labs: 


                                        





                                 12/04/18 07:40 





                                 12/04/18 07:40 





                                        











PT  14.8 SECONDS (9.4-12.5)  H  12/04/18  07:40    


 


INR  1.28   12/04/18  07:40    


 


APTT  29.6 Seconds (25.1-36.5)   12/04/18  07:40    














Attending/Attestation





- Attestation


I have personally seen and examined this patient.: Yes


I have fully participated in the care of the patient.: Yes


I have reviewed all pertinent clinical information, including history, physical 

exam and plan: Yes no